# Patient Record
Sex: MALE | Race: BLACK OR AFRICAN AMERICAN | NOT HISPANIC OR LATINO | Employment: FULL TIME | URBAN - METROPOLITAN AREA
[De-identification: names, ages, dates, MRNs, and addresses within clinical notes are randomized per-mention and may not be internally consistent; named-entity substitution may affect disease eponyms.]

---

## 2023-01-26 ENCOUNTER — LAB (OUTPATIENT)
Dept: INTERNAL MEDICINE | Facility: CLINIC | Age: 48
End: 2023-01-26

## 2023-01-26 ENCOUNTER — OFFICE VISIT (OUTPATIENT)
Dept: INTERNAL MEDICINE | Facility: CLINIC | Age: 48
End: 2023-01-26
Payer: COMMERCIAL

## 2023-01-26 VITALS
WEIGHT: 207.7 LBS | DIASTOLIC BLOOD PRESSURE: 86 MMHG | SYSTOLIC BLOOD PRESSURE: 144 MMHG | RESPIRATION RATE: 18 BRPM | OXYGEN SATURATION: 99 % | TEMPERATURE: 98.2 F | HEART RATE: 72 BPM | BODY MASS INDEX: 29.08 KG/M2 | HEIGHT: 71 IN

## 2023-01-26 DIAGNOSIS — E55.9 VITAMIN D DEFICIENCY: ICD-10-CM

## 2023-01-26 DIAGNOSIS — Z12.11 SCREEN FOR COLON CANCER: ICD-10-CM

## 2023-01-26 DIAGNOSIS — R03.0 ELEVATED BLOOD PRESSURE READING WITHOUT DIAGNOSIS OF HYPERTENSION: ICD-10-CM

## 2023-01-26 DIAGNOSIS — R74.01 ELEVATED AST (SGOT): ICD-10-CM

## 2023-01-26 DIAGNOSIS — R76.8 HEPATITIS B CORE ANTIBODY POSITIVE: ICD-10-CM

## 2023-01-26 DIAGNOSIS — Z00.00 ROUTINE GENERAL MEDICAL EXAMINATION AT A HEALTH CARE FACILITY: Primary | ICD-10-CM

## 2023-01-26 DIAGNOSIS — Z11.59 NEED FOR HEPATITIS C SCREENING TEST: ICD-10-CM

## 2023-01-26 DIAGNOSIS — Z11.4 SCREENING FOR HIV (HUMAN IMMUNODEFICIENCY VIRUS): ICD-10-CM

## 2023-01-26 DIAGNOSIS — R73.03 PREDIABETES: ICD-10-CM

## 2023-01-26 LAB — HBA1C MFR BLD: 5.8 % (ref 0–5.6)

## 2023-01-26 PROCEDURE — 87340 HEPATITIS B SURFACE AG IA: CPT | Performed by: NURSE PRACTITIONER

## 2023-01-26 PROCEDURE — 86706 HEP B SURFACE ANTIBODY: CPT | Performed by: NURSE PRACTITIONER

## 2023-01-26 PROCEDURE — 83540 ASSAY OF IRON: CPT | Performed by: NURSE PRACTITIONER

## 2023-01-26 PROCEDURE — 86803 HEPATITIS C AB TEST: CPT | Performed by: NURSE PRACTITIONER

## 2023-01-26 PROCEDURE — 86704 HEP B CORE ANTIBODY TOTAL: CPT | Performed by: NURSE PRACTITIONER

## 2023-01-26 PROCEDURE — 86705 HEP B CORE ANTIBODY IGM: CPT | Performed by: NURSE PRACTITIONER

## 2023-01-26 PROCEDURE — 82248 BILIRUBIN DIRECT: CPT | Performed by: NURSE PRACTITIONER

## 2023-01-26 PROCEDURE — 99213 OFFICE O/P EST LOW 20 MIN: CPT | Mod: 25 | Performed by: NURSE PRACTITIONER

## 2023-01-26 PROCEDURE — 99000 SPECIMEN HANDLING OFFICE-LAB: CPT | Performed by: NURSE PRACTITIONER

## 2023-01-26 PROCEDURE — 99386 PREV VISIT NEW AGE 40-64: CPT | Performed by: NURSE PRACTITIONER

## 2023-01-26 PROCEDURE — 86707 HEPATITIS BE ANTIBODY: CPT | Mod: 90 | Performed by: NURSE PRACTITIONER

## 2023-01-26 PROCEDURE — 87389 HIV-1 AG W/HIV-1&-2 AB AG IA: CPT | Performed by: NURSE PRACTITIONER

## 2023-01-26 PROCEDURE — 80053 COMPREHEN METABOLIC PANEL: CPT | Performed by: NURSE PRACTITIONER

## 2023-01-26 PROCEDURE — 82306 VITAMIN D 25 HYDROXY: CPT | Performed by: NURSE PRACTITIONER

## 2023-01-26 PROCEDURE — 82728 ASSAY OF FERRITIN: CPT | Performed by: NURSE PRACTITIONER

## 2023-01-26 PROCEDURE — 83550 IRON BINDING TEST: CPT | Performed by: NURSE PRACTITIONER

## 2023-01-26 PROCEDURE — 83036 HEMOGLOBIN GLYCOSYLATED A1C: CPT | Performed by: NURSE PRACTITIONER

## 2023-01-26 PROCEDURE — 36415 COLL VENOUS BLD VENIPUNCTURE: CPT | Performed by: NURSE PRACTITIONER

## 2023-01-26 ASSESSMENT — ENCOUNTER SYMPTOMS
DIZZINESS: 0
DYSURIA: 0
JOINT SWELLING: 0
FREQUENCY: 0
PARESTHESIAS: 0
HEMATOCHEZIA: 0
FEVER: 0
EYE PAIN: 1
WEAKNESS: 0
NAUSEA: 0
ABDOMINAL PAIN: 0
CHILLS: 0
HEADACHES: 0
ARTHRALGIAS: 0
CONSTIPATION: 0
SORE THROAT: 0
HEMATURIA: 0
MYALGIAS: 0
DIARRHEA: 0
PALPITATIONS: 0
SHORTNESS OF BREATH: 0
HEARTBURN: 0
NERVOUS/ANXIOUS: 0
COUGH: 0

## 2023-01-26 NOTE — PATIENT INSTRUCTIONS
Let us know your blood pressure readings in about a week. If your readings are above 130/80, I would recommend that you start on treatment for high blood pressure.       Preventive Health Recommendations  Male Ages 40 to 49    Yearly exam:             See your health care provider every year in order to  o   Review health changes.   o   Discuss preventive care.    o   Review your medicines if your doctor has prescribed any.  You should be tested each year for STDs (sexually transmitted diseases) if you re at risk.   Have a cholesterol test every 5 years.   Have a colonoscopy (test for colon cancer) if someone in your family has had colon cancer or polyps before age 50.   After age 45, have a diabetes test (fasting glucose). If you are at risk for diabetes, you should have this test every 3 years.    Talk with your health care provider about whether or not a prostate cancer screening test (PSA) is right for you.    Shots: Get a flu shot each year. Get a tetanus shot every 10 years.     Nutrition:  Eat at least 5 servings of fruits and vegetables daily.   Eat whole-grain bread, whole-wheat pasta and brown rice instead of white grains and rice.   Get adequate Calcium and Vitamin D.     Lifestyle  Exercise for at least 150 minutes a week (30 minutes a day, 5 days a week). This will help you control your weight and prevent disease.   Limit alcohol to one drink per day.   No smoking.   Wear sunscreen to prevent skin cancer.   See your dentist every six months for an exam and cleaning.

## 2023-01-26 NOTE — PROGRESS NOTES
SUBJECTIVE:   CC: Christopher is an 47 year old who presents for preventative health visit. He does not have a PCP, he was previously a patient at the [facility].     He will be traveling to [facility], likely in February. Has an appointment with a travel clinic.     We reviewed his labs from February 2022.  He had elevated AST.  He states that he drinks alcohol sparingly, at most he may have 2 drinks on the weekend socially.    His last A1c was 5.7% consistent with prediabetes.    Patient has been advised of split billing requirements and indicates understanding: Yes     Healthy Habits:     Getting at least 3 servings of Calcium per day:  NO    Bi-annual eye exam:  Yes    Dental care twice a year:  Yes    Sleep apnea or symptoms of sleep apnea:  None    Diet:  Regular (no restrictions)    Frequency of exercise:  2-3 days/week    Duration of exercise:  45-60 minutes    Taking medications regularly:  Yes    Medication side effects:  Not applicable and None    PHQ-2 Total Score: 0    Additional concerns today:  No        Today's PHQ-2 Score:   PHQ-2 ( 1999 Pfizer) 1/26/2023   Q1: Little interest or pleasure in doing things 0   Q2: Feeling down, depressed or hopeless 0   PHQ-2 Score 0   Q1: Little interest or pleasure in doing things Not at all   Q2: Feeling down, depressed or hopeless Not at all   PHQ-2 Score 0       Have you ever done Advance Care Planning? (For example, a Health Directive, POLST, or a discussion with a medical provider or your loved ones about your wishes): No, advance care planning information given to patient to review.  Patient plans to discuss their wishes with loved ones or provider.      Social History     Tobacco Use     Smoking status: Never     Smokeless tobacco: Never   Substance Use Topics     Alcohol use: Yes     Comment: socially         Alcohol Use 1/26/2023   Prescreen: >3 drinks/day or >7 drinks/week? No       Reviewed orders with patient. Reviewed health maintenance and updated  "orders accordingly - Yes      Reviewed and updated as needed this visit by clinical staff   Tobacco  Allergies  Meds  Problems  Med Hx  Surg Hx  Fam Hx          Reviewed and updated as needed this visit by Provider   Tobacco  Allergies  Meds  Problems  Med Hx  Surg Hx  Fam Hx             Review of Systems   Constitutional: Negative for chills and fever.   HENT: Negative for congestion, ear pain, hearing loss and sore throat.    Eyes: Positive for pain. Negative for visual disturbance.   Respiratory: Negative for cough and shortness of breath.    Cardiovascular: Negative for chest pain, palpitations and peripheral edema.   Gastrointestinal: Negative for abdominal pain, constipation, diarrhea, heartburn, hematochezia and nausea.   Genitourinary: Negative for dysuria, frequency, genital sores, hematuria, impotence, penile discharge and urgency.   Musculoskeletal: Negative for arthralgias, joint swelling and myalgias.   Skin: Negative for rash.   Neurological: Negative for dizziness, weakness, headaches and paresthesias.   Psychiatric/Behavioral: Negative for mood changes. The patient is not nervous/anxious.        OBJECTIVE:   BP (!) 144/86   Pulse 72   Temp 98.2  F (36.8  C) (Oral)   Resp 18   Ht 1.791 m (5' 10.51\")   Wt 94.2 kg (207 lb 11.2 oz)   SpO2 99%   BMI 29.37 kg/m      Physical Exam  GENERAL: healthy, alert and no distress  EYES: Eyes grossly normal to inspection, PERRL and conjunctivae and sclerae normal  HENT: ear canals and TM's normal, nose and mouth without ulcers or lesions  NECK: no adenopathy, no asymmetry, masses, or scars and thyroid normal to palpation  RESP: lungs clear to auscultation - no rales, rhonchi or wheezes  CV: regular rate and rhythm, normal S1 S2, no S3 or S4, no murmur, click or rub, no peripheral edema and peripheral pulses strong  ABDOMEN: soft, nontender, no hepatosplenomegaly, no masses and bowel sounds normal  MS: no gross musculoskeletal defects noted, no " edema  NEURO: Normal strength and tone, mentation intact and speech normal  PSYCH: mentation appears normal, affect normal/bright      ASSESSMENT/PLAN:     Problem List Items Addressed This Visit        Digestive    Vitamin D deficiency     Repeat vitamin D level.  He is not currently taking a vitamin D supplement.         Relevant Orders    Vitamin D deficiency screening       Endocrine    Prediabetes     Repeat A1c today         Relevant Orders    Hemoglobin A1c (Completed)       Other    Elevated blood pressure reading without diagnosis of hypertension     Blood pressure is elevated today, he does not have a diagnosis of hypertension.  He is advised to return in 2 weeks for another blood pressure check.  Additionally, his wife is in nursing school and he can have her monitor his blood pressure at home.  He is advised to follow-up with his blood pressure readings are consistently greater than 130/80.         Elevated AST (SGOT)     Upon review of Care Everywhere, and elevated AST is noted.  He does not drink alcohol heavily.  No prior history of liver abnormalities that he is aware of.  We will start with a hepatitis C screening test and repeat his liver profile.  If elevated, consider the addition of hepatitis B screening, ferritin and iron levels         Relevant Orders    Ferritin    Iron and iron binding capacity    Hepatitis B Surface Antibody    Hepatitis B surface antigen    Hepatitis B core antibody   Other Visit Diagnoses     Routine general medical examination at a health care facility    -  Primary    Screen for colon cancer        Reviewed screening options, he would like to do Cologuard    Relevant Orders    COLOGUARD([employer]) (Completed)    Screening for HIV (human immunodeficiency virus)        Relevant Orders    HIV Antigen Antibody Combo    Need for hepatitis C screening test        Relevant Orders    Hepatitis C Screen Reflex to HCV RNA Quant and Genotype        - He is encouraged to  "establish care with a primary care provider.  I am not presently taking new patients to my practice      COUNSELING:   Reviewed preventive health counseling, as reflected in patient instructions       Regular exercise       Healthy diet/nutrition       Colorectal cancer screening      BMI:   Estimated body mass index is 29.37 kg/m  as calculated from the following:    Height as of this encounter: 1.791 m (5' 10.51\").    Weight as of this encounter: 94.2 kg (207 lb 11.2 oz).   Weight management plan: Discussed healthy diet and exercise guidelines      He reports that he has never smoked. He has never used smokeless tobacco.        Pamela Marion NP  [facility]  "

## 2023-01-27 PROBLEM — R73.03 PREDIABETES: Status: ACTIVE | Noted: 2023-01-27

## 2023-01-27 PROBLEM — R74.01 ELEVATED AST (SGOT): Status: ACTIVE | Noted: 2023-01-27

## 2023-01-27 PROBLEM — R03.0 ELEVATED BLOOD PRESSURE READING WITHOUT DIAGNOSIS OF HYPERTENSION: Status: ACTIVE | Noted: 2023-01-27

## 2023-01-27 LAB
ALBUMIN SERPL BCG-MCNC: 4.7 G/DL (ref 3.5–5.2)
ALP SERPL-CCNC: 86 U/L (ref 40–129)
ALT SERPL W P-5'-P-CCNC: 41 U/L (ref 10–50)
ANION GAP SERPL CALCULATED.3IONS-SCNC: 14 MMOL/L (ref 7–15)
AST SERPL W P-5'-P-CCNC: 100 U/L (ref 10–50)
BILIRUB DIRECT SERPL-MCNC: <0.2 MG/DL (ref 0–0.3)
BILIRUB SERPL-MCNC: 0.5 MG/DL
BUN SERPL-MCNC: 12.9 MG/DL (ref 6–20)
CALCIUM SERPL-MCNC: 10 MG/DL (ref 8.6–10)
CHLORIDE SERPL-SCNC: 105 MMOL/L (ref 98–107)
CREAT SERPL-MCNC: 0.92 MG/DL (ref 0.67–1.17)
DEPRECATED CALCIDIOL+CALCIFEROL SERPL-MC: 10 UG/L (ref 20–75)
DEPRECATED HCO3 PLAS-SCNC: 21 MMOL/L (ref 22–29)
FERRITIN SERPL-MCNC: 263 NG/ML (ref 31–409)
GFR SERPL CREATININE-BSD FRML MDRD: >90 ML/MIN/1.73M2
GLUCOSE SERPL-MCNC: 94 MG/DL (ref 70–99)
HBV CORE AB SERPL QL IA: REACTIVE
HBV SURFACE AB SERPL IA-ACNC: >1000 M[IU]/ML
HBV SURFACE AB SERPL IA-ACNC: REACTIVE M[IU]/ML
HBV SURFACE AG SERPL QL IA: NONREACTIVE
HCV AB SERPL QL IA: NONREACTIVE
HIV 1+2 AB+HIV1 P24 AG SERPL QL IA: NONREACTIVE
IRON BINDING CAPACITY (ROCHE): 376 UG/DL (ref 240–430)
IRON SATN MFR SERPL: 35 % (ref 15–46)
IRON SERPL-MCNC: 130 UG/DL (ref 61–157)
POTASSIUM SERPL-SCNC: 4.7 MMOL/L (ref 3.4–5.3)
PROT SERPL-MCNC: 8.4 G/DL (ref 6.4–8.3)
SODIUM SERPL-SCNC: 140 MMOL/L (ref 136–145)

## 2023-01-27 NOTE — ASSESSMENT & PLAN NOTE
Blood pressure is elevated today, he does not have a diagnosis of hypertension.  He is advised to return in 2 weeks for another blood pressure check.  Additionally, his wife is in nursing school and he can have her monitor his blood pressure at home.  He is advised to follow-up with his blood pressure readings are consistently greater than 130/80.

## 2023-01-27 NOTE — ASSESSMENT & PLAN NOTE
Upon review of Care Everywhere, and elevated AST is noted.  He does not drink alcohol heavily.  No prior history of liver abnormalities that he is aware of.  We will start with a hepatitis C screening test and repeat his liver profile.  If elevated, consider the addition of hepatitis B screening, ferritin and iron levels

## 2023-01-30 ENCOUNTER — OFFICE VISIT (OUTPATIENT)
Dept: FAMILY MEDICINE | Facility: CLINIC | Age: 48
End: 2023-01-30
Payer: COMMERCIAL

## 2023-01-30 VITALS
BODY MASS INDEX: 29.68 KG/M2 | HEIGHT: 71 IN | TEMPERATURE: 98.2 F | SYSTOLIC BLOOD PRESSURE: 128 MMHG | OXYGEN SATURATION: 98 % | WEIGHT: 212 LBS | HEART RATE: 80 BPM | DIASTOLIC BLOOD PRESSURE: 80 MMHG

## 2023-01-30 DIAGNOSIS — Z23 NEED FOR MENINGOCOCCAL VACCINATION: ICD-10-CM

## 2023-01-30 DIAGNOSIS — Z71.84 ENCOUNTER FOR COUNSELING FOR TRAVEL: Primary | ICD-10-CM

## 2023-01-30 DIAGNOSIS — Z23 NEED FOR DIPHTHERIA-TETANUS-PERTUSSIS (TDAP) VACCINE: ICD-10-CM

## 2023-01-30 LAB — HBV CORE IGM SERPL QL IA: NONREACTIVE

## 2023-01-30 PROCEDURE — 90472 IMMUNIZATION ADMIN EACH ADD: CPT | Performed by: PHYSICIAN ASSISTANT

## 2023-01-30 PROCEDURE — 90471 IMMUNIZATION ADMIN: CPT | Performed by: PHYSICIAN ASSISTANT

## 2023-01-30 PROCEDURE — 90619 MENACWY-TT VACCINE IM: CPT | Performed by: PHYSICIAN ASSISTANT

## 2023-01-30 PROCEDURE — 99401 PREV MED CNSL INDIV APPRX 15: CPT | Mod: 25 | Performed by: PHYSICIAN ASSISTANT

## 2023-01-30 PROCEDURE — 90715 TDAP VACCINE 7 YRS/> IM: CPT | Performed by: PHYSICIAN ASSISTANT

## 2023-01-30 RX ORDER — AZITHROMYCIN 500 MG/1
TABLET, FILM COATED ORAL
Qty: 6 TABLET | Refills: 0 | Status: SHIPPED | OUTPATIENT
Start: 2023-01-30 | End: 2023-03-01

## 2023-01-30 RX ORDER — MEFLOQUINE HYDROCHLORIDE 250 MG/1
TABLET ORAL
Qty: 14 TABLET | Refills: 0 | Status: SHIPPED | OUTPATIENT
Start: 2023-01-30 | End: 2023-03-01

## 2023-01-30 NOTE — PROGRESS NOTES
SUBJECTIVE: Christopher Bustamante , a 47 year old  male, presents for counseling and information regarding upcoming travel to [facility]. Special medical concerns include: None. He anticipates the following unusual exposures: None.    Itinerary:  [place]    Departure Date: 02/15/2023 Return date: 04/15/2023    Reason for travel (i.e. Business, pleasure): Pleasure    Visiting an urban or rural area?: Both    Accommodations (i.e. hotel, hostel, friends, family, etc): Family, Hotel    Women - First day of your last period: N/A    IMMUNIZATION HISTORY  Have you received any vaccinations in the past 4 weeks?  Yes  Have you ever fainted from having your blood drawn or from an injection?  No  Have you ever had a fever reaction to vaccination?  No  Have you ever had any bad reaction or side effect from any vaccination?  No  Have you ever had hepatitis A or B vaccine?  Yes  Do you live (or work closely) with anyone who has AIDS, an AIDS-like condition, any other immune disorder or who is on chemotherapy for cancer?  No  Have you received any injection of immune globulin or any blood products during the past 12 months?  No    GENERAL MEDICAL HISTORY  Do you have a medical condition that warrants maintenance medication or physician follow-up?  No  Do you have a medical condition that is stable now, but that may recur while traveling?  No  Has your spleen been removed?  No  Have you had an acute illness or a fever in the past 48 hours?  No  Are you pregnant, or might you become pregnant on this trip?  Any chance of pregnancy?  No  Are you breastfeeding?  No  Do you have HIV, AIDS, an AIDS-like condition, any other immune disorder, leukemia or cancer?  No  Do you have a severe combined immunodeficiency disease?  No  Have you had your thymus gland removed or history of problems with your thymus, such as myasthenia gravis, DiGeorge syndrome, or thymoma?  No    Do you have severe thrombocytopenia (low  platelet count) or a coagulation disorder?  No  Have you ever had a convulsion, seizure, epilepsy, neurologic condition or brain infection?  No  Do you have any stomach conditions?  No  Do you have a G6PD deficiency?  No  Do you have severe renal or kidney impairment?  No  Do you have a history of psychiatric problems?  No  Do you have a problem with strange dreams and/or nightmares?  No  Do you have insomnia?  No  Do you have problems with vaginitis?  No  Do you have psoriasis?  No  Are you prone to motion sickness?  No  Have you ever had headaches, nausea, vomiting, or breathing problems from altitude exposure?  No      No past medical history on file.   Immunization History   Administered Date(s) Administered     COVID-19 Vaccine 12+ (Pfizer) 04/24/2021, 05/22/2021, 01/08/2022     COVID-19 Vaccine Bivalent Booster 12+ (Pfizer) 11/08/2022     FLU 6-35 months 10/10/2016     Influenza (IIV3) PF 10/11/2016     Influenza Vaccine >6 months (Alfuria,Fluzone) 11/17/2014, 10/16/2015, 10/08/2017, 09/27/2018, 10/10/2019, 11/11/2021     Influenza Vaccine, 6+MO IM (QUADRIVALENT W/PRESERVATIVES) 10/10/2019, 09/18/2020     Influenza,INJ,MDCK,PF,Quad >6mo(Flucelvax) 09/18/2020, 11/08/2022     MMR 03/01/2014, 07/11/2017     Mantoux Tuberculin Skin Test 03/04/2015     Meningococcal ACWY (Menveo ) 10/27/2017     Tdap (Adacel,Boostrix) 03/01/2014     Twinrix A/B 11/24/2014, 12/18/2014, 06/11/2015     Typhoid IM 06/11/2015, 10/27/2017, 02/04/2021     Typhoid Oral 12/30/2020     Yellow Fever 10/27/2017       No current outpatient medications on file.     No Known Allergies     EXAM: deferred    Immunizations discussed include: Meningococcus and Tetanus/Diphtheria  Malaria prophylaxis recommended: mefloquine  Symptomatic treatment for traveler's diarrhea: bismuth subsalicylate, loperamide/diphenoxylate and azithromycin    ASSESSMENT/PLAN:    (Z71.84) Encounter for counseling for travel  (primary encounter diagnosis)    Comment: Tdap  and meningococcal vaccines today. Patient will return or follow-up with PCP as needed. Prophylaxis given for Traveler's diarrhea and Malaria. All questions were answered.     Plan: typhoid (VIVOTIF) CR capsule, mefloquine         (LARIAM) 250 MG tablet, azithromycin         (ZITHROMAX) 500 MG tablet            (Z23) Need for meningococcal vaccination  Comment:   Plan: MENINGOCOCCAL ACWY 2-55Y (MENQUADFI )            (Z23) Need for diphtheria-tetanus-pertussis (Tdap) vaccine  Comment:   Plan: TDAP VACCINE (Adacel, Boostrix)  [[id number]]              I have reviewed general recommendations for safe travel   including: food/water precautions, insect avoidance, safe sex   practices given high prevalence of HIV and other STDs,   roadway safety. Educational materials and links to the CDC   Traveler's health website have been provided.    Total time 15 minutes, greater than 50 percent in counseling   and coordination of care.

## 2023-01-31 LAB — HBV E AB SERPL QL IA: POSITIVE

## 2023-02-01 DIAGNOSIS — E55.9 VITAMIN D DEFICIENCY: Primary | ICD-10-CM

## 2023-02-01 DIAGNOSIS — R74.01 ELEVATED AST (SGOT): ICD-10-CM

## 2023-02-01 RX ORDER — ERGOCALCIFEROL 1.25 MG/1
50000 CAPSULE, LIQUID FILLED ORAL WEEKLY
Qty: 12 CAPSULE | Refills: 0 | Status: SHIPPED | OUTPATIENT
Start: 2023-02-01 | End: 2023-03-01

## 2023-02-02 ENCOUNTER — TELEPHONE (OUTPATIENT)
Dept: INTERNAL MEDICINE | Facility: CLINIC | Age: 48
End: 2023-02-02
Payer: COMMERCIAL

## 2023-02-02 NOTE — TELEPHONE ENCOUNTER
Called patient via  services but his phone number was not working. Had  leave a generic message on patients spouses phone number. When they return phone call please see message below for results/message.

## 2023-02-02 NOTE — TELEPHONE ENCOUNTER
----- Message from Pamela Marion NP sent at 2/1/2023  7:46 AM CST -----  Call patient: The results of his lab work shows that he appears to have had hepatitis B infection in the past but has cleared the infection.    His lab tests were negative for hepatitis C, HIV.    He once again has a very low vitamin D level of 10.  I am going to start him on a high-dose vitamin D that is taken weekly x12 weeks.  After he finishes this vitamin D boost, he should start an over-the-counter vitamin D supplement of 2000 units daily.    His blood sugar test, A1c, shows that he has prediabetes at 5.8%.  I recommend weight loss, regular physical activity, and healthy diet to help improve and stabilize his blood sugars.    Lastly, he has an elevated AST level.  This has been noted in the past and its a little higher than it was last year.  I am going to order an ultrasound of the liver.  If this is normal, I recommend that he follow-up with his primary care provider (probably needs to establish care with someone) to recheck/monitor his liver profile.

## 2023-02-10 LAB — NONINV COLON CA DNA+OCC BLD SCRN STL QL: NORMAL

## 2023-02-21 ENCOUNTER — HOSPITAL ENCOUNTER (OUTPATIENT)
Dept: ULTRASOUND IMAGING | Facility: HOSPITAL | Age: 48
Discharge: HOME | End: 2023-02-21
Attending: NURSE PRACTITIONER | Admitting: NURSE PRACTITIONER
Payer: COMMERCIAL

## 2023-02-21 DIAGNOSIS — R74.01 ELEVATED AST (SGOT): ICD-10-CM

## 2023-02-21 PROCEDURE — 76705 ECHO EXAM OF ABDOMEN: CPT

## 2023-02-22 ENCOUNTER — APPOINTMENT (OUTPATIENT)
Dept: RADIOLOGY | Facility: HOSPITAL | Age: 48
End: 2023-02-22
Attending: EMERGENCY MEDICINE
Payer: COMMERCIAL

## 2023-02-22 ENCOUNTER — HOSPITAL ENCOUNTER (EMERGENCY)
Facility: HOSPITAL | Age: 48
Discharge: HOME | End: 2023-02-22
Attending: EMERGENCY MEDICINE | Admitting: EMERGENCY MEDICINE
Payer: COMMERCIAL

## 2023-02-22 VITALS
BODY MASS INDEX: 28.87 KG/M2 | TEMPERATURE: 98 F | SYSTOLIC BLOOD PRESSURE: 127 MMHG | HEIGHT: 71 IN | RESPIRATION RATE: 16 BRPM | WEIGHT: 206.2 LBS | HEART RATE: 68 BPM | OXYGEN SATURATION: 100 % | DIASTOLIC BLOOD PRESSURE: 77 MMHG

## 2023-02-22 DIAGNOSIS — R07.89 CHEST WALL PAIN: ICD-10-CM
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
## 2023-02-22 PROCEDURE — 99284 EMERGENCY DEPT VISIT MOD MDM: CPT | Mod: 25

## 2023-02-22 PROCEDURE — 250N000011 HC RX IP 250 OP 636: Performed by: EMERGENCY MEDICINE

## 2023-02-22 PROCEDURE — 96372 THER/PROPH/DIAG INJ SC/IM: CPT | Performed by: EMERGENCY MEDICINE

## 2023-02-22 PROCEDURE — 71046 X-RAY EXAM CHEST 2 VIEWS: CPT

## 2023-02-22 PROCEDURE — 93005 ELECTROCARDIOGRAM TRACING: CPT | Performed by: EMERGENCY MEDICINE

## 2023-02-22 RX ORDER — KETOROLAC TROMETHAMINE 30 MG/ML
30 INJECTION, SOLUTION INTRAMUSCULAR; INTRAVENOUS ONCE
Status: COMPLETED | OUTPATIENT
Start: 2023-02-22 | End: 2023-02-22

## 2023-02-22 RX ADMIN — KETOROLAC TROMETHAMINE 30 MG: 30 INJECTION, SOLUTION INTRAMUSCULAR; INTRAVENOUS at 16:58

## 2023-02-22 ASSESSMENT — ACTIVITIES OF DAILY LIVING (ADL): ADLS_ACUITY_SCORE: 35
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
## 2023-02-22 NOTE — DISCHARGE INSTRUCTIONS
Take ibuprofen 600mg 3 times a day with food for next 5-7 days. Follow up with primary clinic. Return for new/worsening concerns. This may take 7-10 days to resolve.
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
## 2023-02-24 LAB — NONINV COLON CA DNA+OCC BLD SCRN STL QL: NEGATIVE

## 2023-02-27 ENCOUNTER — TELEPHONE (OUTPATIENT)
Dept: INTERNAL MEDICINE | Facility: CLINIC | Age: 48
End: 2023-02-27
Payer: COMMERCIAL

## 2023-02-27 NOTE — TELEPHONE ENCOUNTER
----- Message from NAVI Sotelo CNP sent at 2/27/2023  8:30 AM CST -----  Please call Christopher and let her know her cologuard is negative. He will need to do this again in three years for appropriate colon cancer screening.     My name is Partha Mancilla and I am covering for Pamela Marion today, please reach out with any questions.     Partha Mancilla

## 2023-03-01 ENCOUNTER — OFFICE VISIT (OUTPATIENT)
Dept: FAMILY MEDICINE | Facility: CLINIC | Age: 48
End: 2023-03-01
Payer: COMMERCIAL

## 2023-03-01 DIAGNOSIS — Z76.89 ENCOUNTER TO ESTABLISH CARE: Primary | ICD-10-CM

## 2023-03-01 DIAGNOSIS — Z71.84 ENCOUNTER FOR COUNSELING FOR TRAVEL: ICD-10-CM

## 2023-03-01 DIAGNOSIS — R76.8 HEPATITIS B CORE ANTIBODY POSITIVE: ICD-10-CM

## 2023-03-01 DIAGNOSIS — Z23 NEED FOR IMMUNIZATION AGAINST TYPHOID: ICD-10-CM

## 2023-03-01 DIAGNOSIS — R74.8 ELEVATED LIVER ENZYMES: ICD-10-CM

## 2023-03-01 DIAGNOSIS — E55.9 VITAMIN D DEFICIENCY: ICD-10-CM

## 2023-03-01 LAB
ALBUMIN SERPL BCG-MCNC: 4.1 G/DL (ref 3.5–5.2)
ALP SERPL-CCNC: 90 U/L (ref 40–129)
ALT SERPL W P-5'-P-CCNC: 110 U/L (ref 10–50)
ANION GAP SERPL CALCULATED.3IONS-SCNC: 10 MMOL/L (ref 7–15)
AST SERPL W P-5'-P-CCNC: 120 U/L (ref 10–50)
BILIRUB SERPL-MCNC: 0.3 MG/DL
BUN SERPL-MCNC: 11.5 MG/DL (ref 6–20)
CALCIUM SERPL-MCNC: 9.5 MG/DL (ref 8.6–10)
CHLORIDE SERPL-SCNC: 104 MMOL/L (ref 98–107)
CREAT SERPL-MCNC: 0.93 MG/DL (ref 0.67–1.17)
DEPRECATED HCO3 PLAS-SCNC: 24 MMOL/L (ref 22–29)
GFR SERPL CREATININE-BSD FRML MDRD: >90 ML/MIN/1.73M2
GLUCOSE SERPL-MCNC: 102 MG/DL (ref 70–99)
POTASSIUM SERPL-SCNC: 4.1 MMOL/L (ref 3.4–5.3)
PROT SERPL-MCNC: 7.5 G/DL (ref 6.4–8.3)
SODIUM SERPL-SCNC: 138 MMOL/L (ref 136–145)

## 2023-03-01 PROCEDURE — 87517 HEPATITIS B DNA QUANT: CPT

## 2023-03-01 PROCEDURE — 99213 OFFICE O/P EST LOW 20 MIN: CPT

## 2023-03-01 PROCEDURE — 80053 COMPREHEN METABOLIC PANEL: CPT

## 2023-03-01 PROCEDURE — 36415 COLL VENOUS BLD VENIPUNCTURE: CPT

## 2023-03-01 RX ORDER — BNT162B2 ORIGINAL AND OMICRON BA.4/BA.5 .1125; .1125 MG/2.25ML; MG/2.25ML
INJECTION, SUSPENSION INTRAMUSCULAR
COMMUNITY
Start: 2022-11-08

## 2023-03-01 RX ORDER — AZITHROMYCIN 500 MG/1
TABLET, FILM COATED ORAL
Qty: 6 TABLET | Refills: 0
Start: 2023-03-01 | End: 2023-03-01

## 2023-03-01 RX ORDER — ERGOCALCIFEROL 1.25 MG/1
50000 CAPSULE, LIQUID FILLED ORAL WEEKLY
Qty: 12 CAPSULE | Refills: 0 | Status: SHIPPED | OUTPATIENT
Start: 2023-03-01

## 2023-03-01 RX ORDER — MEFLOQUINE HYDROCHLORIDE 250 MG/1
TABLET ORAL
Qty: 15 TABLET | Refills: 0 | Status: SHIPPED | OUTPATIENT
Start: 2023-03-01 | End: 2023-06-07

## 2023-03-01 RX ORDER — INFLUENZA A VIRUS A/NEBRASKA/14/2019 (H1N1) ANTIGEN (MDCK CELL DERIVED, PROPIOLACTONE INACTIVATED), INFLUENZA A VIRUS A/DELAWARE/39/2019 (H3N2) ANTIGEN (MDCK CELL DERIVED, PROPIOLACTONE INACTIVATED), INFLUENZA B VIRUS B/SINGAPORE/INFTT-16-0610/2016 ANTIGEN (MDCK CELL DERIVED, PROPIOLACTONE INACTIVATED), INFLUENZA B VIRUS B/DARWIN/7/2019 ANTIGEN (MDCK CELL DERIVED, PROPIOLACTONE INACTIVATED) 15; 15; 15; 15 UG/.5ML; UG/.5ML; UG/.5ML; UG/.5ML
INJECTION, SUSPENSION INTRAMUSCULAR
COMMUNITY
Start: 2022-11-08

## 2023-03-01 RX ORDER — AZITHROMYCIN 500 MG/1
TABLET, FILM COATED ORAL
Qty: 6 TABLET | Refills: 0 | Status: SHIPPED | OUTPATIENT
Start: 2023-03-01

## 2023-03-01 ASSESSMENT — PAIN SCALES - GENERAL: PAINLEVEL: NO PAIN (0)

## 2023-03-01 ASSESSMENT — ENCOUNTER SYMPTOMS
FEVER: 0
CHILLS: 0
DIAPHORESIS: 0
CHEST TIGHTNESS: 0
SHORTNESS OF BREATH: 0
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
## 2023-03-01 NOTE — PATIENT INSTRUCTIONS
We will want to recheck your vitamin D and liver function around June 1, 2023. You can call [phone] to schedule this for that time. Take one vitamin D pill once a week for 12 weeks.

## 2023-03-02 LAB — HBV DNA SERPL NAA+PROBE-ACNC: NOT DETECTED IU/ML

## 2023-03-09 ENCOUNTER — TELEPHONE (OUTPATIENT)
Dept: FAMILY MEDICINE | Facility: CLINIC | Age: 48
End: 2023-03-09
Payer: COMMERCIAL

## 2023-03-09 DIAGNOSIS — R74.8 ELEVATED LIVER ENZYMES: Primary | ICD-10-CM

## 2023-03-09 NOTE — TELEPHONE ENCOUNTER
----- Message from NAVI Sotelo CNP sent at 3/9/2023  8:15 AM CST -----  Please call patient and set-up an appointment with lab on 3/29/2023 for a recheck of his liver function and for an in office BP check with our staff. I had told him he needed to see me that day too, but I can just follow-up with a phone call after I see the BP and the lab.     I already discussed results with him. He is going to make diet changes for NAFLD and we will refer to GI if LFTs are elevated on recheck.     Thanks,     Partha Mancilla DNP, APRN, FNP-C

## 2023-03-09 NOTE — TELEPHONE ENCOUNTER
Spoke with patient. Patient scheduled for labs and BP check for 3/31- patient will be traveling that week.

## 2023-03-14 VITALS
HEIGHT: 71 IN | TEMPERATURE: 98.4 F | WEIGHT: 208 LBS | SYSTOLIC BLOOD PRESSURE: 132 MMHG | RESPIRATION RATE: 18 BRPM | DIASTOLIC BLOOD PRESSURE: 82 MMHG | HEART RATE: 81 BPM | OXYGEN SATURATION: 100 % | BODY MASS INDEX: 29.12 KG/M2

## 2023-03-14 ASSESSMENT — ENCOUNTER SYMPTOMS
CONSTIPATION: 0
DIZZINESS: 0
DIFFICULTY URINATING: 0
NERVOUS/ANXIOUS: 0
PARESTHESIAS: 0
MYALGIAS: 0
PALPITATIONS: 0
NUMBNESS: 0
HEADACHES: 0
DIARRHEA: 0
ABDOMINAL PAIN: 0
ARTHRALGIAS: 0
WEAKNESS: 0

## 2023-03-31 ENCOUNTER — LAB (OUTPATIENT)
Dept: LAB | Facility: CLINIC | Age: 48
End: 2023-03-31
Payer: COMMERCIAL

## 2023-03-31 ENCOUNTER — ALLIED HEALTH/NURSE VISIT (OUTPATIENT)
Dept: FAMILY MEDICINE | Facility: CLINIC | Age: 48
End: 2023-03-31
Payer: COMMERCIAL

## 2023-03-31 VITALS — WEIGHT: 204.3 LBS | SYSTOLIC BLOOD PRESSURE: 122 MMHG | BODY MASS INDEX: 28.7 KG/M2 | DIASTOLIC BLOOD PRESSURE: 80 MMHG

## 2023-03-31 DIAGNOSIS — R03.0 ELEVATED BLOOD PRESSURE READING WITHOUT DIAGNOSIS OF HYPERTENSION: Primary | ICD-10-CM

## 2023-03-31 DIAGNOSIS — R74.8 ELEVATED LIVER ENZYMES: ICD-10-CM

## 2023-03-31 LAB
ALBUMIN SERPL BCG-MCNC: 4.2 G/DL (ref 3.5–5.2)
ALP SERPL-CCNC: 83 U/L (ref 40–129)
ALT SERPL W P-5'-P-CCNC: 35 U/L (ref 10–50)
ANION GAP SERPL CALCULATED.3IONS-SCNC: 11 MMOL/L (ref 7–15)
AST SERPL W P-5'-P-CCNC: 75 U/L (ref 10–50)
BILIRUB SERPL-MCNC: 0.3 MG/DL
BUN SERPL-MCNC: 9.8 MG/DL (ref 6–20)
CALCIUM SERPL-MCNC: 9.6 MG/DL (ref 8.6–10)
CHLORIDE SERPL-SCNC: 104 MMOL/L (ref 98–107)
CREAT SERPL-MCNC: 0.84 MG/DL (ref 0.67–1.17)
DEPRECATED HCO3 PLAS-SCNC: 23 MMOL/L (ref 22–29)
GFR SERPL CREATININE-BSD FRML MDRD: >90 ML/MIN/1.73M2
GLUCOSE SERPL-MCNC: 86 MG/DL (ref 70–99)
POTASSIUM SERPL-SCNC: 4.3 MMOL/L (ref 3.4–5.3)
PROT SERPL-MCNC: 7.9 G/DL (ref 6.4–8.3)
SODIUM SERPL-SCNC: 138 MMOL/L (ref 136–145)

## 2023-03-31 PROCEDURE — 99207 PR NO CHARGE NURSE ONLY: CPT

## 2023-03-31 PROCEDURE — 36415 COLL VENOUS BLD VENIPUNCTURE: CPT

## 2023-03-31 PROCEDURE — 80053 COMPREHEN METABOLIC PANEL: CPT

## 2023-03-31 NOTE — PROGRESS NOTES
I met with Christopher Bustamante at the request of Arina Mancilla CNP to recheck his blood pressure.  Blood pressure medications on the med list were reviewed with patient.    Patient has taken all medications as per usual regimen: NA  Patient reports tolerating them without any issues or concerns: NA    Vitals:    03/31/23 1608   BP: 122/80   BP Location: Right arm   Patient Position: Sitting   Cuff Size: Adult Regular       Blood pressure was taken, previous encounter was reviewed, recorded blood pressure below 140/90.  Patient was discharged and the note will be sent to the provider for final review.       Glenn Gross Jr., CMA on 3/31/2023 at 4:17 PM

## 2023-05-19 ENCOUNTER — ALLIED HEALTH/NURSE VISIT (OUTPATIENT)
Dept: FAMILY MEDICINE | Facility: CLINIC | Age: 48
End: 2023-05-19
Payer: COMMERCIAL

## 2023-05-19 VITALS
SYSTOLIC BLOOD PRESSURE: 118 MMHG | BODY MASS INDEX: 27.28 KG/M2 | DIASTOLIC BLOOD PRESSURE: 80 MMHG | WEIGHT: 194.2 LBS | HEART RATE: 68 BPM

## 2023-05-19 DIAGNOSIS — R03.0 ELEVATED BLOOD PRESSURE READING WITHOUT DIAGNOSIS OF HYPERTENSION: Primary | ICD-10-CM

## 2023-05-19 PROCEDURE — 99207 PR NO CHARGE NURSE ONLY: CPT

## 2023-05-19 NOTE — PROGRESS NOTES
Patient came in for his BP and weight check. His BP was 118/80 and pulse was 68. His weight was 194 lbs and 3.2 oz

## 2023-06-16 ENCOUNTER — MEDICAL CORRESPONDENCE (OUTPATIENT)
Dept: HEALTH INFORMATION MANAGEMENT | Facility: CLINIC | Age: 48
End: 2023-06-16
Payer: COMMERCIAL

## 2023-06-29 ENCOUNTER — TELEPHONE (OUTPATIENT)
Dept: INTERNAL MEDICINE | Facility: CLINIC | Age: 48
End: 2023-06-29
Payer: COMMERCIAL

## 2023-07-11 DIAGNOSIS — Z71.84 ENCOUNTER FOR COUNSELING FOR TRAVEL: ICD-10-CM

## 2023-07-12 RX ORDER — MEFLOQUINE HYDROCHLORIDE 250 MG/1
TABLET ORAL
Qty: 4 TABLET | Refills: 0 | Status: CANCELLED | OUTPATIENT
Start: 2023-07-12

## 2023-07-12 NOTE — TELEPHONE ENCOUNTER
Please call pharmacy and clarify.  We had contacted patient when they sent the last refill request and he said he needed it through August 19, which would have been 4 more tablets.  I did send for 4 more tablets 1 month ago.  Is patient needing more medication to get him to the 4 weeks after returning from the trip?     Partha
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
## 2023-07-17 NOTE — TELEPHONE ENCOUNTER
Left message for patient to call back. When  he calls back please relay message to see if he needs more medication

## 2023-07-19 NOTE — TELEPHONE ENCOUNTER
Called patient and LMTCB #2, rx request to be deleted if no call back from patient today.    Edyta RODARTE  [facility] Clinical Staff

## 2024-01-18 ENCOUNTER — PATIENT OUTREACH (OUTPATIENT)
Dept: CARE COORDINATION | Facility: CLINIC | Age: 49
End: 2024-01-18

## 2024-02-01 ENCOUNTER — PATIENT OUTREACH (OUTPATIENT)
Dept: CARE COORDINATION | Facility: CLINIC | Age: 49
End: 2024-02-01
Payer: COMMERCIAL

## 2024-02-12 ENCOUNTER — OFFICE VISIT (OUTPATIENT)
Dept: INTERNAL MEDICINE | Facility: CLINIC | Age: 49
End: 2024-02-12
Payer: COMMERCIAL

## 2024-02-12 VITALS
BODY MASS INDEX: 28.69 KG/M2 | TEMPERATURE: 98.3 F | RESPIRATION RATE: 16 BRPM | HEIGHT: 71 IN | SYSTOLIC BLOOD PRESSURE: 128 MMHG | HEART RATE: 74 BPM | WEIGHT: 204.9 LBS | OXYGEN SATURATION: 98 % | DIASTOLIC BLOOD PRESSURE: 88 MMHG

## 2024-02-12 DIAGNOSIS — K76.0 HEPATIC STEATOSIS: ICD-10-CM

## 2024-02-12 DIAGNOSIS — Z00.00 ROUTINE GENERAL MEDICAL EXAMINATION AT A HEALTH CARE FACILITY: Primary | ICD-10-CM

## 2024-02-12 DIAGNOSIS — Z12.5 PROSTATE CANCER SCREENING: ICD-10-CM

## 2024-02-12 DIAGNOSIS — R74.01 ELEVATED AST (SGOT): ICD-10-CM

## 2024-02-12 DIAGNOSIS — R73.03 PREDIABETES: ICD-10-CM

## 2024-02-12 DIAGNOSIS — R03.0 ELEVATED BLOOD PRESSURE READING WITHOUT DIAGNOSIS OF HYPERTENSION: ICD-10-CM

## 2024-02-12 DIAGNOSIS — E78.2 MIXED HYPERLIPIDEMIA: ICD-10-CM

## 2024-02-12 DIAGNOSIS — E55.9 VITAMIN D DEFICIENCY: ICD-10-CM

## 2024-02-12 LAB — HBA1C MFR BLD: 5.9 % (ref 0–5.6)

## 2024-02-12 PROCEDURE — 80061 LIPID PANEL: CPT | Performed by: NURSE PRACTITIONER

## 2024-02-12 PROCEDURE — 36415 COLL VENOUS BLD VENIPUNCTURE: CPT | Performed by: NURSE PRACTITIONER

## 2024-02-12 PROCEDURE — 82306 VITAMIN D 25 HYDROXY: CPT | Performed by: NURSE PRACTITIONER

## 2024-02-12 PROCEDURE — 83036 HEMOGLOBIN GLYCOSYLATED A1C: CPT | Performed by: NURSE PRACTITIONER

## 2024-02-12 PROCEDURE — 99396 PREV VISIT EST AGE 40-64: CPT | Performed by: NURSE PRACTITIONER

## 2024-02-12 PROCEDURE — G0103 PSA SCREENING: HCPCS | Performed by: NURSE PRACTITIONER

## 2024-02-12 PROCEDURE — 80076 HEPATIC FUNCTION PANEL: CPT | Performed by: NURSE PRACTITIONER

## 2024-02-12 SDOH — HEALTH STABILITY: PHYSICAL HEALTH: ON AVERAGE, HOW MANY DAYS PER WEEK DO YOU ENGAGE IN MODERATE TO STRENUOUS EXERCISE (LIKE A BRISK WALK)?: 2 DAYS

## 2024-02-12 SDOH — HEALTH STABILITY: PHYSICAL HEALTH: ON AVERAGE, HOW MANY MINUTES DO YOU ENGAGE IN EXERCISE AT THIS LEVEL?: 30 MIN

## 2024-02-12 ASSESSMENT — SOCIAL DETERMINANTS OF HEALTH (SDOH): HOW OFTEN DO YOU GET TOGETHER WITH FRIENDS OR RELATIVES?: TWICE A WEEK

## 2024-02-12 NOTE — COMMUNITY RESOURCES LIST (ENGLISH)
02/12/2024   [facility]  N/A  For questions about this resource list or additional care needs, please contact your primary care clinic or care manager.  Phone: [phone]   Email: N/A   Address: [address] [place] [address]   Hours: N/A        Financial Stability       Rent and mortgage payment assistance  1  [facility] - Rent Payment Assistance Distance: 1.47 miles      Phone/Virtual   [address] [place] [address]  Language: English  Hours: Mon - Fri 9:00 AM - 4:00 PM , Sun 9:30 AM - 12:00 PM  Fees: Free   Phone: (914) 363-5879 Email: ligia@[facility] CampusAmideBio.Rival IQ Website: http://[facility] CampusPebbles Interfaces.org/[facility]/Opzi/     2  [facility] Distance: 2.77 miles      In-Person, Phone/Virtual   [address] [place] [address]  Language: English, Moldovan  Hours: Mon - Thu 9:00 AM - 4:00 PM  Fees: Free   Phone: (855) 907-9636 Email: center@saintandrews.[facility] Website: https://www.saintandrews.[facility]/     Utility payment assistance  3  [facility] - Heatshare Distance: 1.47 miles      Phone/Virtual   [address] [place] [address]  Language: English  Hours: Mon - Fri 9:00 AM - 4:00 PM , Sun 9:30 AM - 12:00 PM  Fees: Free   Phone: (466) 585-8559 Email: ligia@[facility] CampusAmideBio.org Website: http://[facility] CampusPebbles Interfaces.org/[facility]/Opzi/     4  [place] Public Utilities Commission - [place]'s Telephone Assistance Plan (TAP) and Federal Lifeline and Affordable Connectivity Program (ACP) Distance: 5.84 miles      Phone/Virtual   [address] [place] [address]  Language: English  Fees: Free   Phone: (323) 319-7664 Email: deborah.elbert@[facility].mn. Website: https://[place].gov/puc/consumers/telephone/          Food and Nutrition       Food pantry  5  [facility] Service  Center - Food Shelf Distance: 1.47 miles      Pickup   [address] [place] [address]  Language: English  Hours: Mon - Wed 9:30 AM - 2:30 PM  Fees: Free   Phone: (583) 496-9099 Email: ligia@[facility] Fort WorthKnowRe.WyzAnt.com Website: http://[facility].org/[facility] HospitalsusanEstacada/     6  [facility] Food Shelf - Food Shelf Distance: 1.76 miles      Pickup   [address] [place] [address]  Language: English, Jordanian  Hours: Mon - Tue 2:00 PM - 7:00 PM , Wed 11:00 AM - 2:00 PM , Fri 11:00 AM - 2:00 PM  Fees: Free   Phone: (161) 845-2477 Email: info@official.fm.WyzAnt.com Website: http://official.fm.org     SNAP application assistance  7  Comunidades Latinas Unidas En Servicio (CLUES) - [place] Distance: 7.04 miles      In-Person   [address] [place] [address]  Language: English, Jordanian  Hours: Mon - Fri 8:30 AM - 5:00 PM  Fees: Free   Phone: (945) 654-2114 Email: info@clues.org Website: http://www.clues.org     8  [place] Department of Human Services - MNFoodHelCarol[facility] (SNAP) Distance: 8.04 miles      Phone/Virtual   [address] [address] [place] [address]  Language: English, Hmong, Ecuadorean, Wallisian, Jordanian, Tamazight  Hours: Mon - Fri 9:00 AM - 5:00 PM  Fees: Free   Phone: (283) 581-4066 Website: https://[place].gov/dhs/people-we-serve/adults/economic-assistance/food-nutrition/programs-and-services/supplemental-nutrition-assistance-program.jsp     Soup kitchen or free meals  9  [facility] - Thursday Night Community Meal Distance: 2.77 miles      In-Person   [address] [place] [address]  Language: English, Jordanian  Hours: Thu 6:00 PM - 7:00 PM  Fees: Free   Phone: (960) 610-9941 Email: center@[facility]s.[facility] Website: https://www.saintandrews.WyzAnt.com/community-resource-center/     10  [facility] - Loaves and Fishes - Loaves and Fishes Distance: 4.63 miles      Pickup   [address] [place] [address]   Language: English  Hours: Wed 5:30 PM - 6:30 PM  Fees: Free   Phone: (743) 522-1166 Email: office@REVENUE.com.org Website: https://www.My Healthy WorldK[facility]tering Health Hamilton.org          Transportation       Free or low-cost transportation  11  [facility] CenterT[facility] Bus Loop - Free or low-cost transportation Distance: 1.45 miles      In-Person   [address] [place] [address]  Language: English  Hours: Mon - Fri 9:00 AM - 5:00 PM  Fees: Free   Phone: (176) 161-5396 Email: info@[employer] Camp Website: https://www.My Online Camp/     12  [facility] - Free Bus and Gas Cards - Free or low-cost transportation Distance: 1.47 miles      [facility]   [address] [place] [address]  Language: English  Hours: Mon - Fri 9:00 AM - 4:00 PM , Sun 9:30 AM - 12:00 PM  Fees: Free   Phone: (313) 317-9115 Email: ligia@[facility] Website: http://[facility]/[place]/     Transportation to medical appointments  13  Discover Ride Distance: 5.75 miles      In-Person   [address] [address]  Language: English  Hours: Mon - Thu 6:00 AM - 6:00 PM , Fri 6:00 AM - 5:00 PM  Fees: Insurance, Self Pay   Phone: (839) 658-3609 Email: office@Cloudbuild Website: https://www.Cloudbuild/     14  [employer] Transportation - Non-Emergency Medical Transportation Distance: 9.21 miles      In-Person   [address] [place] [address]  Language: English  Hours: Mon - Fri 8:00 AM - 4:00 PM Appt. Only  Fees: Self Pay   Phone: (775) 994-1757 Website: http://www.[employer].org/Medical-Services/Emergency-medical-services/Non-emergency-transportation/          Important Numbers & Websites       Emergency Services   911  [facility] Services   311  Poison Control   (189) 448-3086  Suicide Prevention Lifeline   (552) 261-1978 (TALK)  Child Abuse Hotline   (322) 760-5765 (4-A-Child)  Sexual Assault Hotline   (608) 503-2751 (HOPE)  [place]ent Runaway Safeline   (415)  327-8902 (RUNAWAY)  All-Options Talkline   (408) 487-8744  Substance Abuse Referral   (993) 291-5381 (HELP)

## 2024-02-12 NOTE — COMMUNITY RESOURCES LIST (PATIENT PREFERRED LANGUAGE)
02/12/2024   [facility]  N/A  Pour toute question concernant cette liste de ressources ou arsenio besoins en soins supplémentaires, jhonny contacter votre clinique de soins primaires ou votre gestionnaire de soins.  Phone: [phone]   Email: N/A   Address: [address] [place], [place] [address]   Hours: N/A        Stabilité financière       Aide au paiement du loyer et du prêt hypothécaire  1  Armée du [place] de culte et de service de [place] - Aide au paiement du loyer Distance: 1.47 kilomètres      Téléphone/Virtuel   [address] [place] [address]  Langue: Anglais  Heures: Lundi - Vendredi 09:00 - 16:00 , alize 09:30 - 12:00  Frais: Arnol   Phone: ([phone]) [phone] Email: ligia@[facility] Website: http://[facility].org/[facility]/[place]/     2  [facility] de ressources communautaires Distance: 2.77 kilomètres      En personne, Téléphone/Virtuel   [address] [place] [address]  Langue: Alexa Hubbard  Heures: Lundi - Jeudi 09:00 - 16:00  Frais: Arnol   Phone: (828) [phone] Email: center@[facility]s.[facility] Website: https://www.saintandrews.org/[facility]-resource-center/     Aide au paiement brayan services publics  3  [employer] [place] de culte et de service de [place] - Partage de chaleur Distance: 1.47 kilomètres      Téléphone/Virtuel   [address] [place] [address]  Langue: Mariannais  Heures: Lundi - Vendredi 09:00 - 16:00 , alize 09:30 - 12:00  Frais: Arnol   Phone: (954) 335-1950 Email: ligia@[facility].salvationarmy.org Website: http://salvationarmynorth.org/community/[place]/     4  Commission brayan services publics du [place] - Plan d'assistance téléphonique (TAP) du [place] et programme fédéral de bouée de sauvetage et de connectivité abordable (ACP) Distance: 5.84 kilomètres      Téléphone/Virtuel   [address] [place] [address]  Langue: Stevie Fox:  Nidiatuit   Phone: (847) 299-5201 Email: consumer.elbert@[facility]. Website: https://[place].gov/puc/consumers/telephone/          Alimentation et nutrition       Patricia  5  Armée du [facility] de culte et de service de [place] - Étagère alimentaire Distance: 1.47 kilomètres      Ramasser   [address] [place] [address]  Langue: Anglais  Heures: Lundi - Mercredi 09:30 - 14:30  Frais: Gratuit   Phone: (612) 533-4777 Email: ligia@[facility].[employer].org Website: http://[facility] EnergeticsP[facility].org/[facility]/Hemet/     6  Étagère alimentaire de la région de ?[facility] - Étagère alimentaire Distance: 1.76 kilomètres      Ramasser   [address] [place] [address]  Langue: Anglais, Espagnol  Heures: Lundi - Bo 14:00 - 19:00 , Mercredi 11:00 - 14:00 , Vendredi 11:00 - 14:00  Frais: Gratuit   Phone: (440) [phone] Email: info@[employer]org Website: http://[employer]..org     Aide à l'application SNAP  7  Comunidades Latinas Unidas En Servicio (CLUES) - [place] Distance: 7.04 kilomètres      En personne   [address] [place] [address]  Langue: Anglais, Espagnol  Heures: Lundi - Vendredi 08:30 - 17:00  Frais: Gratuit   Phone: (486) 957-8437 Email: info@clues.org Website: http://www.clues.org     8  Département brayan services sociaux [place] (SNAP) Distance: 8.04 kilomètres      Téléphone/Virtue   [address] [address] [place] [address]  Langue: Anglais, Espagnol, Hmong, russe, Niuean, vietnamien  Heures: Lundi - Vendredi 09:00 - 17:00  Frais: Gratuit   Phone: (504) 931-6737 Website: https://[place].gov/dhs/people-we-serve/adults/economic-assistance/food-nutrition/programs-and-services/supplemental-nutrition-assistance-program.jsp     Soupe populaire ou repas gratuits  9  [facility] de ressources communautaires - Repas communautaire du [facility] soir Distance: 2.77 kilomètres      En personne   [address] [place] [address]   Langue: Alexa Hubbard  Heures: Jeudi 18:00 - 19:00  Frais: Gratuit   Phone: ([phone]) [phone] Email: center@saintandrewsYouRenewNortheast [facility] Website: https://www.saintandrews.[employer]el[employer]ost/[facility]-resource-center/     10  [place] luthérienne [place] - Pains et poissons - Pains et poissons Distance: 4.63 kilomètres      Ramasser   [address] [place] [address]  Langue: Anglais  Heures: Mercredi 17:30 - 18:30  Frais: Gratuit   Phone: (119) 243-3859 Email: office@[employer] Website: https://www.SoMoLendmn.org          Transport       Transport gratuit ou à faible coût  11  [employer] - Boucle de bus communautaire - Transport gratuit ou à faible coût Distance: 1.45 kilomètres      En personne   [address] [place] [address]  Langue: Anglais  Heures: Lundi - Vendredi 09:00 - 17:00  Frais: Gratuit   Phone: (969) 566-9045 Email: info@[employer]. Website: https://www.Bridge U.S./     12  Armée du [facility] de culte et de service de [place] - [place] de bus et d'essence gratuites - Transport gratuit ou à faible coût Distance: 1.47 kilomètres      [facility]   [address] [place] [address]  Langue: Anglais  Heures: Lundi - Vendredi 09:00 - 16:00 , alize 09:30 - 12:00  Frais: Gratuit   Phone: (623) 635-7023 Email: ligia@[facility]\""[place].org Website: http://[facility] HospitalAuthentic8[facility].org/[facility]/[place]/     Transport aux rendez-vous médicaux  13  Découvrez Ride Distance: 5.75 kilomètres      En personne   [address] [address]  Langue: Anglais  Heures: Lundi - Jeudi 06:00 - 18:00 , Vendredi 06:00 - 17:00  Danetteis: Assurance, Auto-pairuddy   Phone: (321) [phone] Email: office@[employer] Website: https://www.[employer]/     14  [facility] Transport Médical - Transport médical non urgent Distance: 9.21 kilomètres      En [address] [address]  Langue: Stevie Waters: Neeta - Yousifredpatrick 08:00 - 16:00 Jessica Fox: Auto-paiement    Phone: (209) 636-3843 Website: http://www.[facility].org/Medical-Services/Emergency-medical-services/Non-emergency-transportation/          Numéros et sites Web importants       Services d'urgence   911  Services municipaux   311  Contrôle du poison   (433) 341-2062  Ligne de vie pour la prévention du suicide   (559) 156-4517 (TALK)  Hotline de maltraitance brayan enfants   (352) 834-8672 (4-A-Child)  Hotline pour les agressions sexuelles   (147) 205-7130 (HOPE)  National Runaway Safeline   (946) 395-6637 (RUNAWAY)  Talkline toutes options   (964) 299-4088  Renvoi à la toxicomanie   (751) [phone] (HELP)

## 2024-02-12 NOTE — PROGRESS NOTES
"Preventive Care Visit  [facility]  Paemla Marion NP  Feb 12, 2024    Assessment & Plan   Problem List Items Addressed This Visit       Vitamin D deficiency     He just completed a high-dose vitamin D regimen.  He is advised to start vitamin D 2000 international units daily.         Relevant Orders    Vitamin D Deficiency (Completed)    Elevated blood pressure reading without diagnosis of hypertension     Diastolic reading is elevated.  His wife is an RN and is able to monitor his blood pressure. If readings are >140/90, he should be re-evaluated          Prediabetes     A1c is 5.9%.  Resume regular physical activity, less alcohol, and a Mediterranean diet         Relevant Orders    Hemoglobin A1c (Completed)    Hepatic steatosis     Weight loss encouraged.         Mixed hyperlipidemia     10-year ASCVD risk is 4.5%.  No medication is indicated at this time but he is encouraged to resume regular physical activity and a low-cholesterol diet.          Other Visit Diagnoses       Routine general medical examination at a health care facility    -  Primary    Relevant Orders    Lipid panel reflex to direct LDL Fasting (Completed)    Prostate cancer screening        Relevant Orders    PSA, screen (Completed)           I do not have record of his most recent COVID-19 vaccine but he states that this was given last fall.  He will bring documentation to a future visit.         BMI  Estimated body mass index is 28.78 kg/m  as calculated from the following:    Height as of this encounter: 1.797 m (5' 10.75\").    Weight as of this encounter: 92.9 kg (204 lb 14.4 oz).       Counseling  Appropriate preventive services were discussed with this patient, including applicable screening as appropriate for fall prevention, nutrition, physical activity, Tobacco-use cessation, weight loss and cognition.  Checklist reviewing preventive services available has been given to the patient.  Reviewed patient's diet, " addressing concerns and/or questions.   He is at risk for lack of exercise and has been provided with information to increase physical activity for the benefit of his well-being.   He is at risk for psychosocial distress and has been provided with information to reduce risk.       Subjective   Christopher is a 48 year old, presenting for the following:  Physical        2/12/2024     4:53 PM   Additional Questions   Roomed by Ronal DE LA TORRE        Health Care Directive  Patient does not have a Health Care Directive or Living Will: Discussed advance care planning with patient; information given to patient to review.    HPI  Christopher Bustamante is a 47 y/o here for a physical.    He had prediabetes. He had been exercising regularly and eating well but then he went to [facility] Nov to January and drank more alcohol and was not exercising regularly. When he is here, he exercises at the [facility].     Blood pressure- He had a check at the dentist last week, his reading was 121/88.         2/12/2024   General Health   How would you rate your overall physical health? Good   Feel stress (tense, anxious, or unable to sleep) Only a little   (!) STRESS CONCERN      2/12/2024   Nutrition   Three or more servings of calcium each day? (!) NO   Diet: Low salt    Low fat/cholesterol    Breakfast skipped   How many servings of fruit and vegetables per day? (!) 2-3   How many sweetened beverages each day? 0-1         2/12/2024   Exercise   Days per week of moderate/strenous exercise 2 days   Average minutes spent exercising at this level 30 min   (!) EXERCISE CONCERN      2/12/2024   Social Factors   Frequency of gathering with friends or relatives Twice a week         2/12/2024   Dental   Dentist two times every year? Yes          No data to display                    2/12/2024   Substance Use   Alcohol more than 3/day or more than 7/wk No   Do you use any other substances recreationally? (!) ALCOHOL     Social History     Tobacco Use    Smoking status: Never     " Passive exposure: Never    Smokeless tobacco: Never   Vaping Use    Vaping Use: Never used   Substance Use Topics    Alcohol use: Yes     Comment: socially           2/12/2024   STI Screening   New sexual partner(s) since last STI/HIV test? No   The 10-year ASCVD risk score (Jennifer MORA, et al., 2019) is: 4.9%    Values used to calculate the score:      Age: 48 years      Sex: Male      Is Non- : Yes      Diabetic: No      Tobacco smoker: No      Systolic Blood Pressure: 128 mmHg      Is BP treated: No      HDL Cholesterol: 36 mg/dL      Total Cholesterol: 186 mg/dL        2/12/2024   Contraception/Family Planning   Questions about contraception or family planning (!) DECLINE     Reviewed and updated as needed this visit by Provider   Tobacco  Allergies  Meds  Problems  Med Hx  Surg Hx  Fam Hx                   Objective    Exam  /88   Pulse 74   Temp 98.3  F (36.8  C) (Oral)   Resp 16   Ht 1.797 m (5' 10.75\")   Wt 92.9 kg (204 lb 14.4 oz)   SpO2 98%   BMI 28.78 kg/m     Estimated body mass index is 28.78 kg/m  as calculated from the following:    Height as of this encounter: 1.797 m (5' 10.75\").    Weight as of this encounter: 92.9 kg (204 lb 14.4 oz).    Wt Readings from Last 5 Encounters:   02/12/24 92.9 kg (204 lb 14.4 oz)   05/19/23 88.1 kg (194 lb 3.2 oz)   03/31/23 92.7 kg (204 lb 4.8 oz)   03/01/23 94.3 kg (208 lb)   02/22/23 93.5 kg (206 lb 3.2 oz)         Physical Exam  GENERAL: alert and no distress  EYES: Eyes grossly normal to inspection, conjunctivae and sclerae normal  HENT: ear canals and TM's normal, mouth without ulcers or lesions  NECK: no adenopathy, no asymmetry, masses, or scars  RESP: lungs clear to auscultation - no rales, rhonchi or wheezes  CV: regular rate and rhythm, normal S1 S2, no S3 or S4, no murmur, click or rub, no peripheral edema  ABDOMEN: soft, nontender, no hepatosplenomegaly, no masses and bowel sounds normal  MS: no gross " musculoskeletal defects noted, no edema  NEURO: Normal strength and tone, mentation intact and speech normal  PSYCH: mentation appears normal, affect normal/bright      Signed Electronically by: Pamela Marion NP

## 2024-02-12 NOTE — PATIENT INSTRUCTIONS
"Eating Healthy Foods: Care Instructions  With every meal, you can make healthy food choices. Try to eat a variety of fruits, vegetables, whole grains, lean proteins, and low-fat dairy products. This can help you get the right balance of nutrients, including vitamins and minerals. Small changes add up over time. You can start by adding one healthy food to your meals each day.    Try to make half your plate fruits and vegetables, one-fourth whole grains, and one-fourth lean proteins. Try including dairy with your meals.   Eat more fruits and vegetables. Try to have them with most meals and snacks.   Foods for healthy eating    Fruits    These can be fresh, frozen, canned, or dried.  Try to choose whole fruit rather than fruit juice.  Eat a variety of colors.    Vegetables    These can be fresh, frozen, canned, or dried.  Beans, peas, and lentils count too.    Whole grains    Choose whole-grain breads, cereals, and noodles.  Try brown rice.    Lean proteins    These can include lean meat, poultry, fish, and eggs.  You can also have tofu, beans, peas, lentils, nuts, and seeds.    Dairy    Try milk, yogurt, and cheese.  Choose low-fat or fat-free when you can.  If you need to, use lactose-free milk or fortified plant-based milk products, such as soy milk.    Water    Drink water when you're thirsty.  Limit sugar-sweetened drinks, including soda, fruit drinks, and sports drinks.  Where can you learn more?  Go to https://www.[employer].net/patiented  Enter T756 in the search box to learn more about \"Eating Healthy Foods: Care Instructions.\"  Current as of: February 28, 2023               Content Version: 13.8    1516-0243 [employer].   Care instructions adapted under license by your healthcare professional. If you have questions about a medical condition or this instruction, always ask your healthcare professional. [employer] disclaims any warranty or liability for your use of this " information.      Learning About Stress  What is stress?     Stress is your body's response to a hard situation. Your body can have a physical, emotional, or mental response. Stress is a fact of life for most people, and it affects everyone differently. What causes stress for you may not be stressful for someone else.  A lot of things can cause stress. You may feel stress when you go on a job interview, take a test, or run a race. This kind of short-term stress is normal and even useful. It can help you if you need to work hard or react quickly. For example, stress can help you finish an important job on time.  Long-term stress is caused by ongoing stressful situations or events. Examples of long-term stress include long-term health problems, ongoing problems at work, or conflicts in your family. Long-term stress can harm your health.  How does stress affect your health?  When you are stressed, your body responds as though you are in danger. It makes hormones that speed up your heart, make you breathe faster, and give you a burst of energy. This is called the fight-or-flight stress response. If the stress is over quickly, your body goes back to normal and no harm is done.  But if stress happens too often or lasts too long, it can have bad effects. Long-term stress can make you more likely to get sick, and it can make symptoms of some diseases worse. If you tense up when you are stressed, you may develop neck, shoulder, or low back pain. Stress is linked to high blood pressure and heart disease.  Stress also harms your emotional health. It can make you gonzáles, tense, or depressed. Your relationships may suffer, and you may not do well at work or school.  What can you do to manage stress?  You can try these things to help manage stress:   Do something active. Exercise or activity can help reduce stress. Walking is a great way to get started. Even everyday activities such as housecleaning or yard work can help.  Try  yoga or soniya chi. These techniques combine exercise and meditation. You may need some training at first to learn them.  Do something you enjoy. For example, listen to music or go to a movie. Practice your hobby or do volunteer work.  Meditate. This can help you relax, because you are not worrying about what happened before or what may happen in the future.  Do guided imagery. Imagine yourself in any setting that helps you feel calm. You can use online videos, books, or a teacher to guide you.  Do breathing exercises. For example:  From a standing position, bend forward from the waist with your knees slightly bent. Let your arms dangle close to the floor.  Breathe in slowly and deeply as you return to a standing position. Roll up slowly and lift your head last.  Hold your breath for just a few seconds in the standing position.  Breathe out slowly and bend forward from the waist.  Let your feelings out. Talk, laugh, cry, and express anger when you need to. Talking with supportive friends or family, a counselor, or a nunu leader about your feelings is a healthy way to relieve stress. Avoid discussing your feelings with people who make you feel worse.  Write. It may help to write about things that are bothering you. This helps you find out how much stress you feel and what is causing it. When you know this, you can find better ways to cope.  What can you do to prevent stress?  You might try some of these things to help prevent stress:  Manage your time. This helps you find time to do the things you want and need to do.  Get enough sleep. Your body recovers from the stresses of the day while you are sleeping.  Get support. Your family, friends, and community can make a difference in how you experience stress.  Limit your news feed. Avoid or limit time on social media or news that may make you feel stressed.  Do something active. Exercise or activity can help reduce stress. Walking is a great way to get started.  Where  "can you learn more?  Go to https://www.[employer].net/patiented  Enter N032 in the search box to learn more about \"Learning About Stress.\"  Current as of: February 26, 2023               Content Version: 13.8    9012-1849 [employer].   Care instructions adapted under license by your healthcare professional. If you have questions about a medical condition or this instruction, always ask your healthcare professional. [employer] disclaims any warranty or liability for your use of this information.      Substance Use Disorder: Care Instructions  Overview     You can improve your life and health by stopping your use of alcohol or drugs. When you don't drink or use drugs, you may feel and sleep better. You may get along better with your family, friends, and coworkers. There are medicines and programs that can help with substance use disorder.  How can you care for yourself at home?  Here are some ways to help you stay sober and prevent relapse.  If you have been given medicine to help keep you sober or reduce your cravings, be sure to take it exactly as prescribed.  Talk to your doctor about programs that can help you stop using drugs or drinking alcohol.  Do not keep alcohol or drugs in your home.  Plan ahead. Think about what you'll say if other people ask you to drink or use drugs. Try not to spend time with people who drink or use drugs.  Use the time and money spent on drinking or drugs to do something that's important to you.  Preventing a relapse  Have a plan to deal with relapse. Learn to recognize changes in your thinking that lead you to drink or use drugs. Get help before you start to drink or use drugs again.  Try to stay away from situations, friends, or places that may lead you to drink or use drugs.  If you feel the need to drink alcohol or use drugs again, seek help right away. Call a trusted friend or family member. Some people get support from organizations such as Narcotics " Anonymous or [facility] [employer] or from treatment facilities.  If you relapse, get help as soon as you can. Some people make a plan with another person that outlines what they want that person to do for them if they relapse. The plan usually includes how to handle the relapse and who to notify in case of relapse.  Don't give up. Remember that a relapse doesn't mean that you have failed. Use the experience to learn the triggers that lead you to drink or use drugs. Then quit again. Recovery is a lifelong process. Many people have several relapses before they are able to quit for good.  Follow-up care is a key part of your treatment and safety. Be sure to make and go to all appointments, and call your doctor if you are having problems. It's also a good idea to know your test results and keep a list of the medicines you take.  When should you call for help?   Call 911  anytime you think you may need emergency care. For example, call if you or someone else:    Has overdosed or has withdrawal signs. Be sure to tell the emergency workers that you are or someone else is using or trying to quit using drugs. Overdose or withdrawal signs may include:  Losing consciousness.  Seizure.  Seeing or hearing things that aren't there (hallucinations).     Is thinking or talking about suicide or harming others.   Where to get help 24 hours a day, 7 days a week   If you or someone you know talks about suicide, self-harm, a mental health crisis, a substance use crisis, or any other kind of emotional distress, get help right away. You can:    Call the Suicide and Crisis Lifeline at 988.     Call 1-933-742-TALK (1-656.452.1912).     Text HOME to 803333 to access the Crisis Text Line.   Consider saving these numbers in your phone.  Go to Gymtrack.org for more information or to chat online.  Call your doctor now or seek immediate medical care if:    You are having withdrawal symptoms. These may include nausea or vomiting, sweating, shakiness,  "and anxiety.   Watch closely for changes in your health, and be sure to contact your doctor if:    You have a relapse.     You need more help or support to stop.   Where can you learn more?  Go to https://www.Dopios.net/patiented  Enter H573 in the search box to learn more about \"Substance Use Disorder: Care Instructions.\"  Current as of: March 21, 2023               Content Version: 13.8    4088-6595 [employer].   Care instructions adapted under license by your healthcare professional. If you have questions about a medical condition or this instruction, always ask your healthcare professional. [employer] disclaims any warranty or liability for your use of this information.      Preventive Care Advice   This is general advice given by our system to help you stay healthy. However, your care team may have specific advice just for you. Please talk to your care team about your preventive care needs.  Nutrition  Eat 5 or more servings of fruits and vegetables each day.  Try wheat bread, brown rice and whole grain pasta (instead of white bread, rice, and pasta).  Get enough calcium and vitamin D. Check the label on foods and aim for 100% of the RDA (recommended daily allowance).  Lifestyle  Exercise at least 150 minutes each week  (30 minutes a day, 5 days a week).  Do muscle strengthening activities 2 days a week. These help control your weight and prevent disease.  No smoking.  Wear sunscreen to prevent skin cancer.  Have a dental exam and cleaning every 6 months.  Yearly exams  See your health care team every year to talk about:  Any changes in your health.  Any medicines your care team has prescribed.  Preventive care, family planning, and ways to prevent chronic diseases.  Shots (vaccines)   HPV shots (up to age 26), if you've never had them before.  Hepatitis B shots (up to age 59), if you've never had them before.  COVID-19 shot: Get this shot when it's due.  Flu shot: Get a flu shot " every year.  Tetanus shot: Get a tetanus shot every 10 years.  Pneumococcal, hepatitis A, and RSV shots: Ask your care team if you need these based on your risk.  Shingles shot (for age 50 and up)  General health tests  Diabetes screening:  Starting at age 35, Get screened for diabetes at least every 3 years.  If you are younger than age 35, ask your care team if you should be screened for diabetes.  Cholesterol test: At age 39, start having a cholesterol test every 5 years, or more often if advised.  Bone density scan (DEXA): At age 50, ask your care team if you should have this scan for osteoporosis (brittle bones).  Hepatitis C: Get tested at least once in your life.  STIs (sexually transmitted infections)  Before age 24: Ask your care team if you should be screened for STIs.  After age 24: Get screened for STIs if you're at risk. You are at risk for STIs (including HIV) if:  You are sexually active with more than one person.  You don't use condoms every time.  You or a partner was diagnosed with a sexually transmitted infection.  If you are at risk for HIV, ask about PrEP medicine to prevent HIV.  Get tested for HIV at least once in your life, whether you are at risk for HIV or not.  Cancer screening tests  Cervical cancer screening: If you have a cervix, begin getting regular cervical cancer screening tests starting at age 21.  Breast cancer scan (mammogram): If you've ever had breasts, begin having regular mammograms starting at age 40. This is a scan to check for breast cancer.  Colon cancer screening: It is important to start screening for colon cancer at age 45.  Have a colonoscopy test every 10 years (or more often if you're at risk) Or, ask your provider about stool tests like a FIT test every year or Cologuard test every 3 years.  To learn more about your testing options, visit:   https://[email or web address]/933799.pdf.  For help making a decision, visit:   https://bit.ly/rf87771.  Prostate cancer screening  test: If you have a prostate, ask your care team if a prostate cancer screening test (PSA) at age 55 is right for you.  Lung cancer screening: If you are a current or former smoker ages 50 to 80, ask your care team if ongoing lung cancer screenings are right for you.  For informational purposes only. Not to replace the advice of your health care provider. Copyright   2023 [facility]. All rights reserved. Clinically reviewed by the [employer] [facility]itions Program. [employer] 747495 - REV 01/24.

## 2024-02-13 PROBLEM — K76.0 HEPATIC STEATOSIS: Status: ACTIVE | Noted: 2023-01-27

## 2024-02-13 PROBLEM — E78.2 MIXED HYPERLIPIDEMIA: Status: ACTIVE | Noted: 2024-02-13

## 2024-02-13 LAB
ALBUMIN SERPL BCG-MCNC: 4.3 G/DL (ref 3.5–5.2)
ALP SERPL-CCNC: 87 U/L (ref 40–150)
ALT SERPL W P-5'-P-CCNC: 25 U/L (ref 0–70)
AST SERPL W P-5'-P-CCNC: 55 U/L (ref 0–45)
BILIRUB DIRECT SERPL-MCNC: <0.2 MG/DL (ref 0–0.3)
BILIRUB SERPL-MCNC: 0.2 MG/DL
CHOLEST SERPL-MCNC: 186 MG/DL
FASTING STATUS PATIENT QL REPORTED: ABNORMAL
HDLC SERPL-MCNC: 36 MG/DL
LDLC SERPL CALC-MCNC: 129 MG/DL
NONHDLC SERPL-MCNC: 150 MG/DL
PROT SERPL-MCNC: 7.9 G/DL (ref 6.4–8.3)
PSA SERPL DL<=0.01 NG/ML-MCNC: 0.84 NG/ML (ref 0–2.5)
TRIGL SERPL-MCNC: 104 MG/DL
VIT D+METAB SERPL-MCNC: 26 NG/ML (ref 20–50)

## 2024-02-13 NOTE — ASSESSMENT & PLAN NOTE
He just completed a high-dose vitamin D regimen.  He is advised to start vitamin D 2000 international units daily.

## 2024-02-13 NOTE — ASSESSMENT & PLAN NOTE
Diastolic reading is elevated.  His wife is an RN and is able to monitor his blood pressure. If readings are >140/90, he should be re-evaluated

## 2024-02-13 NOTE — ASSESSMENT & PLAN NOTE
10-year ASCVD risk is 4.5%.  No medication is indicated at this time but he is encouraged to resume regular physical activity and a low-cholesterol diet.

## 2024-09-17 NOTE — LETTER
February 13, 2024      Christopher Bustamante  [address] [place] [address]        Dear ,    We are writing to inform you of your test results.    Your A1c is still in a prediabetes range and has come up just a little bit since last year.  I think if you return to your exercise regimen and eating better this will improve.    Your liver enzymes also continue to improve.    The vitamin D level is on the low end of normal.  Please start an over-the-counter supplement of vitamin D 2000 international units daily.    Cholesterol is elevated.  When I calculate your 10-year risk of heart disease, this is considered to be low at 4.5%.  No medication is indicated at this time but it is important that you resume a more heart healthy diet and exercise routine.      Resulted Orders   Hemoglobin A1c   Result Value Ref Range    Hemoglobin A1C 5.9 (H) 0.0 - 5.6 %      Comment:      Normal <5.7%   Prediabetes 5.7-6.4%    Diabetes 6.5% or higher     Note: Adopted from ADA consensus guidelines.   Hepatic panel (Albumin, ALT, AST, Bili, Alk Phos, TP)   Result Value Ref Range    Protein Total 7.9 6.4 - 8.3 g/dL    Albumin 4.3 3.5 - 5.2 g/dL    Bilirubin Total 0.2 <=1.2 mg/dL    Alkaline Phosphatase 87 40 - 150 U/L      Comment:      Reference intervals for this test were updated on 11/14/2023 to more accurately reflect our healthy population. There may be differences in the flagging of prior results with similar values performed with this method. Interpretation of those prior results can be made in the context of the updated reference intervals.    AST 55 (H) 0 - 45 U/L      Comment:      Reference intervals for this test were updated on 6/12/2023 to more accurately reflect our healthy population. There may be differences in the flagging of prior results with similar values performed with this method. Interpretation of those prior results can be made in the context of the updated reference intervals.    ALT 25 0 - 70 U/L  Patient transported to X-ray     Daniel Franco  09/17/24 [id number]          Comment:      Reference intervals for this test were updated on 6/12/2023 to more accurately reflect our healthy population. There may be differences in the flagging of prior results with similar values performed with this method. Interpretation of those prior results can be made in the context of the updated reference intervals.      Bilirubin Direct <0.20 0.00 - 0.30 mg/dL   Vitamin D Deficiency   Result Value Ref Range    Vitamin D, Total (25-Hydroxy) 26 20 - 50 ng/mL      Comment:      optimum levels    Narrative    Season, race, dietary intake, and treatment affect the concentration of 25-hydroxy-Vitamin D. Values may decrease during winter months and increase during summer months.    Vitamin D determination is routinely performed by an immunoassay specific for 25 hydroxyvitamin D3.  If an individual is on vitamin D2(ergocalciferol) supplementation, please specify 25 OH vitamin D2 and D3 level determination by LCMSMS test VITD23.     Lipid panel reflex to direct LDL Fasting   Result Value Ref Range    Cholesterol 186 <200 mg/dL    Triglycerides 104 <150 mg/dL    Direct Measure HDL 36 (L) >=40 mg/dL    LDL Cholesterol Calculated 129 (H) <=100 mg/dL    Non HDL Cholesterol 150 (H) <130 mg/dL    Patient Fasting > 8hrs? Unknown     Narrative    Cholesterol  Desirable:  <200 mg/dL    Triglycerides  Normal:  Less than 150 mg/dL  Borderline High:  150-199 mg/dL  High:  200-499 mg/dL  Very High:  Greater than or equal to 500 mg/dL    Direct Measure HDL  Female:  Greater than or equal to 50 mg/dL   Male:  Greater than or equal to 40 mg/dL    LDL Cholesterol  Desirable:  <100mg/dL  Above Desirable:  100-129 mg/dL   Borderline High:  130-159 mg/dL   High:  160-189 mg/dL   Very High:  >= 190 mg/dL    Non HDL Cholesterol  Desirable:  130 mg/dL  Above Desirable:  130-159 mg/dL  Borderline High:  160-189 mg/dL  High:  190-219 mg/dL  Very High:  Greater than or equal to 220 mg/dL   PSA, screen   Result Value Ref Range     Prostate Specific Antigen Screen 0.84 0.00 - 2.50 ng/mL    Narrative    This result is obtained using the Roche Elecsys total PSA method on the george [id number] immunoassay analyzer. Results obtained with different assay methods or kits cannot be used interchangeably.       If you have any questions or concerns, please call the clinic at the number listed above.       Sincerely,      Pamela Marion, DNP, APRN, CNP

## 2024-11-01 ENCOUNTER — HOSPITAL ENCOUNTER (EMERGENCY)
Facility: HOSPITAL | Age: 49
Discharge: HOME | End: 2024-11-01
Attending: EMERGENCY MEDICINE | Admitting: EMERGENCY MEDICINE

## 2024-11-01 VITALS
HEART RATE: 59 BPM | DIASTOLIC BLOOD PRESSURE: 96 MMHG | HEIGHT: 71 IN | TEMPERATURE: 96 F | RESPIRATION RATE: 16 BRPM | WEIGHT: 200.2 LBS | BODY MASS INDEX: 28.03 KG/M2 | SYSTOLIC BLOOD PRESSURE: 155 MMHG | OXYGEN SATURATION: 99 %

## 2024-11-01 DIAGNOSIS — S39.012A STRAIN OF LUMBAR REGION, INITIAL ENCOUNTER: ICD-10-CM

## 2024-11-01 DIAGNOSIS — M54.50 ACUTE LOW BACK PAIN WITHOUT SCIATICA, UNSPECIFIED BACK PAIN LATERALITY: ICD-10-CM

## 2024-11-01 PROCEDURE — 99284 EMERGENCY DEPT VISIT MOD MDM: CPT

## 2024-11-01 RX ORDER — CYCLOBENZAPRINE HCL 10 MG
10 TABLET ORAL 3 TIMES DAILY PRN
Qty: 20 TABLET | Refills: 0 | Status: SHIPPED | OUTPATIENT
Start: 2024-11-01 | End: 2024-11-01

## 2024-11-01 RX ORDER — NAPROXEN 500 MG/1
500 TABLET ORAL 2 TIMES DAILY WITH MEALS
Qty: 24 TABLET | Refills: 0 | Status: SHIPPED | OUTPATIENT
Start: 2024-11-01 | End: 2024-11-01

## 2024-11-01 RX ORDER — NAPROXEN 500 MG/1
500 TABLET ORAL 2 TIMES DAILY WITH MEALS
Qty: 24 TABLET | Refills: 0 | Status: SHIPPED | OUTPATIENT
Start: 2024-11-01

## 2024-11-01 RX ORDER — CYCLOBENZAPRINE HCL 10 MG
10 TABLET ORAL 3 TIMES DAILY PRN
Qty: 20 TABLET | Refills: 0 | Status: SHIPPED | OUTPATIENT
Start: 2024-11-01

## 2024-11-01 ASSESSMENT — ENCOUNTER SYMPTOMS: BACK PAIN: 1

## 2024-11-01 ASSESSMENT — COLUMBIA-SUICIDE SEVERITY RATING SCALE - C-SSRS
2. HAVE YOU ACTUALLY HAD ANY THOUGHTS OF KILLING YOURSELF IN THE PAST MONTH?: NO
6. HAVE YOU EVER DONE ANYTHING, STARTED TO DO ANYTHING, OR PREPARED TO DO ANYTHING TO END YOUR LIFE?: NO
1. IN THE PAST MONTH, HAVE YOU WISHED YOU WERE DEAD OR WISHED YOU COULD GO TO SLEEP AND NOT WAKE UP?: NO

## 2024-11-01 ASSESSMENT — ACTIVITIES OF DAILY LIVING (ADL): ADLS_ACUITY_SCORE: 0

## 2024-11-01 NOTE — Clinical Note
Christopher Bustamante was seen and treated in our emergency department on 11/1/2024.  He may return to work on 11/04/2024.  Recommend no lifting greater than 20 pounds.  No frequent bending.  No driving more than 1 hour without frequent stops to allow stretching.  Restrictions are in effect for 1 week     If you have any questions or concerns, please don't hesitate to call.      Albert Galeana MD

## 2024-11-02 NOTE — ED PROVIDER NOTES
EMERGENCY DEPARTMENT ENCOUNTER      NAME: Christopher Bustamante  AGE: 49 year old male  YOB: 1975  MRN: [id number]  EVALUATION DATE & TIME: 11/1/2024  7:37 PM    PCP: Arina Aldridge    ED PROVIDER: Albert Galeana MD      Chief Complaint   Patient presents with    Back Pain         FINAL IMPRESSION:  1. Acute low back pain without sciatica, unspecified back pain laterality    2. Strain of lumbar region, initial encounter          ED COURSE & MEDICAL DECISION MAKING:    Pertinent Labs & Imaging studies reviewed. (See chart for details)  49 year old male presents to the Emergency Department for evaluation of back pain that occurred immediately after lifting heavy object at work    ED Course as of 11/01/24 2025 Fri Nov 01, 2024 1946 I met patient and performed my initial exam   1958 Patient presents with low back injury that occurred immediately after lifting heavy beam.  No radicular symptoms.  No fever.  No weakness.  No tenderness.  No red flag symptoms that would mandate emergent imaging   1958 He has tried Tylenol 1 time   1958 Plan for discharge home with Flexeril, naproxen, recommend Tylenol, work restrictions, follow-up with [facility]   2004 Patient requesting meds to be sent to [facility].   2004 Patient history most likely lumbar sprain.  No weakness on exam.  No pulsatile mass.  Highly doubt fracture of no midline spine tenderness.  Doubt AAA.  Doubt cauda equina.  Doubt epidural abscess.   2005 Discharge home Flexeril, naproxen, referral to [facility]       Medical Decision Making  Obtained supplemental history:Supplemental history obtained?: Documented in chart  Reviewed external records: External records reviewed?: Documented in chart and Outpatient Record: Patient was seen at [facility] on 02/12/24  Care impacted by chronic illness:Hyperlipidemia  Did you consider but not order tests?: Work up considered but not performed and documented in chart, if applicable  Did  "you interpret images independently?: Independent interpretation of ECG and images noted in documentation, when applicable.  Consultation discussion with other provider:Did you involve another provider (consultant, , pharmacy, etc.)?: No  Discharge. I prescribed additional prescription strength medication(s) as charted. N/A.    MIPS: Not Applicable            At the conclusion of the encounter I discussed the results of all of the tests and the disposition. The questions were answered. The patient or family acknowledged understanding and was agreeable with the care plan.         MEDICATIONS GIVEN IN THE EMERGENCY:  Medications - No data to display    NEW PRESCRIPTIONS STARTED AT TODAY'S ER VISIT  Discharge Medication List as of 11/1/2024  8:05 PM             =================================================================    TRIAGE ASSESSMENT:  Patient was lifting a beam two days ago and hurt his back. Pain in his lumbar      Triage Assessment (Adult)       Row Name 11/01/24 1933          Triage Assessment    Airway WDL WDL        Respiratory WDL    Respiratory WDL WDL        Skin Circulation/Temperature WDL    Skin Circulation/Temperature WDL WDL        Cardiac WDL    Cardiac WDL WDL        Peripheral/Neurovascular WDL    Peripheral Neurovascular WDL WDL        Cognitive/Neuro/Behavioral WDL    Cognitive/Neuro/Behavioral WDL WDL                          HPI    Patient information was obtained from: Patient     Use of : N/A       Christopher Bustamante is a 49 year old male with a pertinent history of HLD who presents to this ED via walk-in for evaluation of mid lower back pain.     The patient reports he was lifting a beam two days ago (10/30/24), and hurt his back. Patient is a  and lifts heavy materials. He states he \"immediately\" felt the pain after lifting the beam, but mentioned that the pain was not \"that bad\" yesterday. However, at night, patient cannot turn or move around comfortably. The back " pain is worse when leaning forward or leaning to the right side. Patient also endorses some mild pain that warps around to the front when walking around. Patient's wife gave him 2 doses of tylenol around 7:06 PM. He also mentions that he takes vitamin D daily. Patient endorses frequent urination at night.  No other complaints at this time.     REVIEW OF SYSTEMS   Review of Systems   Musculoskeletal:  Positive for back pain.        PAST MEDICAL HISTORY:  History reviewed. No pertinent past medical history.    PAST SURGICAL HISTORY:  No past surgical history on file.        CURRENT MEDICATIONS:    cyclobenzaprine (FLEXERIL) 10 MG tablet  naproxen (NAPROSYN) 500 MG tablet  azithromycin (ZITHROMAX) 500 MG tablet  FLUCELVAX QUADRIVALENT 0.5 ML JENNIFER  mefloquine (LARIAM) 250 MG tablet  PFIZER COVID-19 VAC BIVALENT 30 MCG/0.3ML injection  vitamin D2 (ERGOCALCIFEROL) 02237 units (1250 mcg) capsule        ALLERGIES:  No Known Allergies    FAMILY HISTORY:  No family history on file.    SOCIAL HISTORY:   Social History     Socioeconomic History    Marital status:    Tobacco Use    Smoking status: Never     Passive exposure: Never    Smokeless tobacco: Never   Vaping Use    Vaping status: Never Used   Substance and Sexual Activity    Alcohol use: Yes     Comment: socially     Social Drivers of Health     Financial Resource Strain: High Risk (2/12/2024)    Financial Resource Strain     Within the past 12 months, have you or your family members you live with been unable to get utilities (heat, electricity) when it was really needed?: Yes   Food Insecurity: High Risk (2/12/2024)    Food Insecurity     Within the past 12 months, did you worry that your food would run out before you got money to buy more?: Yes     Within the past 12 months, did the food you bought just not last and you didn t have money to get more?: Yes   Transportation Needs: High Risk (2/12/2024)    Transportation Needs     Within the past 12 months, has  "lack of transportation kept you from medical appointments, getting your medicines, non-medical meetings or appointments, work, or from getting things that you need?: Yes   Physical Activity: Insufficiently Active (2/12/2024)    Exercise Vital Sign     Days of Exercise per Week: 2 days     Minutes of Exercise per Session: 30 min   Stress: No Stress Concern Present (2/12/2024)    [facility] Occupational Health - Occupational Stress Questionnaire     Feeling of Stress : Only a little    Received from [facility] [employer] [facility]    Social Connections   Interpersonal Safety: Low Risk  (2/12/2024)    Interpersonal Safety     Do you feel physically and emotionally safe where you currently live?: Yes     Within the past 12 months, have you been hit, slapped, kicked or otherwise physically hurt by someone?: No     Within the past 12 months, have you been humiliated or emotionally abused in other ways by your partner or ex-partner?: No   Housing Stability: High Risk (2/12/2024)    Housing Stability     Do you have housing? : Yes     Are you worried about losing your housing?: Yes       VITALS:  BP (!) 155/96   Pulse 59   Temp (!) 96  F (35.6  C)   Resp 16   Ht 1.803 m (5' 11\")   Wt 90.8 kg (200 lb 3.2 oz)   SpO2 99%   BMI 27.92 kg/m      PHYSICAL EXAM      Vitals: BP (!) 155/96   Pulse 59   Temp (!) 96  F (35.6  C)   Resp 16   Ht 1.803 m (5' 11\")   Wt 90.8 kg (200 lb 3.2 oz)   SpO2 99%   BMI 27.92 kg/m    General: Appears in no acute distress, awake, alert, interactive.  Eyes: Conjunctivae non-injected. Sclera anicteric.  HENT: Atraumatic.  Neck: Supple.  Respiratory/Chest: Respiration unlabored.  Abdomen: non distended  Musculoskeletal: Normal extremities. No edema or erythema. Stiff with range of motion.   Back:  Normal gait, able to stand on toes and heels, normal bilateral ankle dorsiflexion/plantar flexion/inversion/eversion/1st toe extension, normal bilateral patellar and " Achilles reflexes. No midline spine tenderness. No step offs. No crepitus. No visible deformity to spine. No pulsatile abdominal masses. Light touch sensation intact to LE.     Skin: Normal color. No rash or diaphoresis.  Neurologic: Face symmetric, moves all extremities spontaneously. Speech clear.  Psychiatric: Oriented to person, place, and time. Affect appropriate.    LAB:  All pertinent labs reviewed and interpreted.       RADIOLOGY:  Reviewed all pertinent imaging. Please see official radiology report.  No orders to display             I, Maryann Wolf, am serving as a scribe to document services personally performed by Albert Galeana MD based on my observation and the provider's statements to me. I, Albert Galeana MD, attest that Maryann Wolf is acting in a scribe capacity, has observed my performance of the services and has documented them in accordance with my direction.    Albert Galeana MD  [facility] EMERGENCY DEPARTMENT  [address] [place] [id number]  [phone]      Albert Galeana MD  11/01/24 [id number]

## 2024-11-02 NOTE — DISCHARGE INSTRUCTIONS
Recommend naproxen twice a day.  Recommend Flexeril 3 times a day as needed for stiffness.  You can combine this with Tylenol.  Follow-up with your doctor as well as [facility].  I did create a referral.

## 2024-11-02 NOTE — ED TRIAGE NOTES
Patient was lifting a beam two days ago and hurt his back. Pain in his lumbar      Triage Assessment (Adult)       Row Name 11/01/24 1933          Triage Assessment    Airway WDL WDL        Respiratory WDL    Respiratory WDL WDL        Skin Circulation/Temperature WDL    Skin Circulation/Temperature WDL WDL        Cardiac WDL    Cardiac WDL WDL        Peripheral/Neurovascular WDL    Peripheral Neurovascular WDL WDL        Cognitive/Neuro/Behavioral WDL    Cognitive/Neuro/Behavioral WDL WDL

## 2024-11-04 ENCOUNTER — PATIENT OUTREACH (OUTPATIENT)
Dept: FAMILY MEDICINE | Facility: CLINIC | Age: 49
End: 2024-11-04
Payer: COMMERCIAL

## 2024-11-06 NOTE — TELEPHONE ENCOUNTER
Left 2nd message for patient to return call to clinic.  Closing encounter now, if patient returns call please transfer to nursing team.

## 2025-01-13 ENCOUNTER — PATIENT OUTREACH (OUTPATIENT)
Dept: CARE COORDINATION | Facility: CLINIC | Age: 50
End: 2025-01-13
Payer: COMMERCIAL

## 2025-01-27 ENCOUNTER — PATIENT OUTREACH (OUTPATIENT)
Dept: CARE COORDINATION | Facility: CLINIC | Age: 50
End: 2025-01-27
Payer: COMMERCIAL

## 2025-02-05 ENCOUNTER — TELEPHONE (OUTPATIENT)
Dept: FAMILY MEDICINE | Facility: CLINIC | Age: 50
End: 2025-02-05
Payer: COMMERCIAL

## 2025-02-05 NOTE — TELEPHONE ENCOUNTER
Reason for Call:  Appointment Request    Patient requesting this type of appt:  [facility]    Requested provider:  Whoever is available     Reason patient unable to be scheduled: Not within requested timeframe    When does patient want to be seen/preferred time: 1-2 weeks    Comments: Patient is leaving on 02/20/2025, next opening is 02/24/2025. Needs to be seen before then.    Okay to leave a detailed message?: Yes at Cell number on file:    Telephone Information:   Mobile [phone]       Call taken on 2/5/2025 at 8:34 AM by Pamela Asif

## 2025-02-05 NOTE — TELEPHONE ENCOUNTER
I called and spoke to the patient. He is needing vaccines for Travel so I referred him to the [facility] clinic and gave him their number to call for an appointment.    Keri Reyes [facility]

## 2025-02-06 ENCOUNTER — OFFICE VISIT (OUTPATIENT)
Dept: OPHTHALMOLOGY | Facility: CLINIC | Age: 50
End: 2025-02-06
Payer: COMMERCIAL

## 2025-02-06 DIAGNOSIS — H04.123 DRY EYE SYNDROME, BILATERAL: Primary | ICD-10-CM

## 2025-02-06 DIAGNOSIS — H52.203 MYOPIA OF BOTH EYES WITH ASTIGMATISM AND PRESBYOPIA: ICD-10-CM

## 2025-02-06 DIAGNOSIS — H52.4 MYOPIA OF BOTH EYES WITH ASTIGMATISM AND PRESBYOPIA: ICD-10-CM

## 2025-02-06 DIAGNOSIS — H52.13 MYOPIA OF BOTH EYES WITH ASTIGMATISM AND PRESBYOPIA: ICD-10-CM

## 2025-02-06 ASSESSMENT — CONF VISUAL FIELD
OS_SUPERIOR_TEMPORAL_RESTRICTION: 0
OS_SUPERIOR_NASAL_RESTRICTION: 0
OS_NORMAL: 1
OD_INFERIOR_NASAL_RESTRICTION: 0
OS_INFERIOR_TEMPORAL_RESTRICTION: 0
OD_SUPERIOR_TEMPORAL_RESTRICTION: 0
OD_INFERIOR_TEMPORAL_RESTRICTION: 0
OD_SUPERIOR_NASAL_RESTRICTION: 0
OD_NORMAL: 1
OS_INFERIOR_NASAL_RESTRICTION: 0

## 2025-02-06 ASSESSMENT — REFRACTION_MANIFEST
OS_AXIS: 007
OS_SPHERE: -1.25
OD_ADD: +1.75
OS_ADD: +1.75
OD_CYLINDER: +0.75
OD_AXIS: 007
OS_CYLINDER: +1.25
OD_SPHERE: -0.75

## 2025-02-06 ASSESSMENT — SLIT LAMP EXAM - LIDS
COMMENTS: 1+ MEIBOMIAN GLAND DYSFUNCTION
COMMENTS: 1+ MEIBOMIAN GLAND DYSFUNCTION

## 2025-02-06 ASSESSMENT — EXTERNAL EXAM - LEFT EYE: OS_EXAM: NORMAL

## 2025-02-06 ASSESSMENT — CUP TO DISC RATIO
OD_RATIO: 0.35
OS_RATIO: 0.4

## 2025-02-06 ASSESSMENT — EXTERNAL EXAM - RIGHT EYE: OD_EXAM: NORMAL

## 2025-02-06 ASSESSMENT — VISUAL ACUITY
OS_SC+: -1
OS_SC: 20/60
OD_SC: 20/40
OD_SC+: -1
OS_SC: J1-
OS_PH_SC: 20/25
OS_PH_SC+: -3
OD_PH_SC: 20/25
METHOD: SNELLEN - LINEAR
OD_SC: J2

## 2025-02-06 ASSESSMENT — TONOMETRY
OS_IOP_MMHG: 20
OD_IOP_MMHG: 18
IOP_METHOD: APPLANATION

## 2025-02-06 NOTE — PROGRESS NOTES
Current Eye Medications:  Systane both eyes twice a day or three times a day.       Subjective:  Comprehensive Eye Exam.  The patient complains of dryness in both eyes for the past 5 years.  Systane helps for a short time, but it doesn't last.  No vision changes or concern, except slight decreasing distance vision.  Reading vision is good without correction.      Last eye exam:  one year ago.  He was given a glasses prescription, but was told it was optional.       No previous eye injuries or surgeries.      Objective:  See Ophthalmology Exam.       Assessment:  Christopher Bustamante is a 49 year old male who presents with:   Encounter Diagnoses   Name Primary?    Dry eye syndrome, bilateral Yes    Myopia of both eyes with astigmatism and presbyopia        Plan:  Try substituting your artificial tears for a gel tear up to four times a day (like Refresh Liquigel or GenTeal Gel Tears)     Use warm compresses daily on the eyes     Glasses prescription given - recommend wearing glasses to sharpen up the distance (when driving)    Yossi Bolden MD  [phone]

## 2025-02-06 NOTE — LETTER
2/6/2025      Christopher Bustamante  [address] [place]  [place] [address]      Dear Colleague,    Thank you for referring your patient, Christopher Bustamante, to the [facility]. Please see a copy of my visit note below.     Current Eye Medications:  Systane both eyes twice a day or three times a day.       Subjective:  Comprehensive Eye Exam.  The patient complains of dryness in both eyes for the past 5 years.  Systane helps for a short time, but it doesn't last.  No vision changes or concern, except slight decreasing distance vision.  Reading vision is good without correction.      Last eye exam:  one year ago.  He was given a glasses prescription, but was told it was optional.        Objective:  See Ophthalmology Exam.       Assessment:  Christopher Bustamante is a 49 year old male who presents with:   Encounter Diagnoses   Name Primary?     Dry eye syndrome, bilateral Yes     Myopia of both eyes with astigmatism and presbyopia        Plan:  Try substituting your artificial tears for a gel tear up to four times a day (like Refresh Liquigel or GenTeal Gel Tears)     Use warm compresses daily on the eyes     Glasses prescription given - recommend wearing glasses to sharpen up the distance (when driving)    Yossi Bolden MD  [phone]     Again, thank you for allowing me to participate in the care of your patient.        Sincerely,        Yossi Bolden MD    Electronically signed

## 2025-02-06 NOTE — PATIENT INSTRUCTIONS
Try substituting your artificial tears for a gel tear up to four times a day (like Refresh Liquigel or GenTeal Gel Tears)     Use warm compresses daily on the eyes     Glasses prescription given - recommend wearing glasses to sharpen up the distance (when driving)    Yossi Bolden MD  [phone]

## 2025-02-11 ENCOUNTER — OFFICE VISIT (OUTPATIENT)
Dept: FAMILY MEDICINE | Facility: CLINIC | Age: 50
End: 2025-02-11
Payer: COMMERCIAL

## 2025-02-11 VITALS
SYSTOLIC BLOOD PRESSURE: 128 MMHG | TEMPERATURE: 98.5 F | WEIGHT: 203.2 LBS | DIASTOLIC BLOOD PRESSURE: 81 MMHG | RESPIRATION RATE: 12 BRPM | BODY MASS INDEX: 28.45 KG/M2 | HEIGHT: 71 IN | OXYGEN SATURATION: 99 % | HEART RATE: 65 BPM

## 2025-02-11 DIAGNOSIS — Z71.84 ENCOUNTER FOR COUNSELING FOR TRAVEL: Primary | ICD-10-CM

## 2025-02-11 PROCEDURE — 99402 PREV MED CNSL INDIV APPRX 30: CPT | Mod: GA | Performed by: PHYSICIAN ASSISTANT

## 2025-02-11 RX ORDER — AZITHROMYCIN 500 MG/1
TABLET, FILM COATED ORAL
Qty: 6 TABLET | Refills: 0 | Status: SHIPPED | OUTPATIENT
Start: 2025-02-11

## 2025-02-11 RX ORDER — MEFLOQUINE HYDROCHLORIDE 250 MG/1
TABLET ORAL
Qty: 14 TABLET | Refills: 0 | Status: SHIPPED | OUTPATIENT
Start: 2025-02-11

## 2025-02-11 NOTE — PROGRESS NOTES
SUBJECTIVE: Christopher Butsamante , a 49 year old  male, presents for counseling and information regarding upcoming travel to [facility]. Special medical concerns include: none. He anticipates the following unusual exposures: wondered about vaccines if they were to have unprotected intercourse with other people.    Itinerary:  [facility]    Departure Date: 2/22/25 Return date: 4/20/25    Reason for travel (i.e. Business, pleasure): pleasure    Visiting an urban or rural area?: both    Accommodations (i.e. hotel, hostel, friends, family, etc): family and friends and hotel      IMMUNIZATION HISTORY  Have you received any vaccinations in the past 4 weeks? If so, which? Yes, Influenza  Have you ever fainted from having your blood drawn or from an injection?  No  Have you ever had any bad reaction or side effect from any vaccination?  If so, which? No  Do you live (or work closely) with anyone who has AIDS, an AIDS-like condition, any other immune disorder or who is on chemotherapy for cancer?  No  Have you received any injection of immune globulin or any blood products during the past 12 months?  No    GENERAL MEDICAL HISTORY  Do you have a medical condition that requires medicine or doctor follow-up visits?  No  Do you have a medical condition that is stable now, but that may recur while traveling?  No  Has your spleen been removed?  No  Have you had an illness or a fever in the past 48 hours?  No  Are you pregnant, or might you become pregnant on this trip?  Any chance of pregnancy?  No  Are you breastfeeding?  No  Do you have HIV, AIDS, an AIDS-like condition, any other immune disorder, leukemia or cancer?  No  Have you had your thymus gland removed or history of problems with your thymus, such as myasthenia gravis, DiGeorge syndrome, or thymoma?  No  Do you have a severely low platelet count (thrombocytopenia) or a blood clotting disorder?  No  Have you ever had a convulsion, seizure, epilepsy, neurologic condition or brain  infection?  No  Do you have any stomach conditions?  No  Do you have severe renal or kidney impairment?  No  Do you have a history of mental health concerns?  No  Do you get yeast infections often?  No  Do you have psoriasis?  No  Do you get motion sickness?  No  Have you ever had headaches, nausea, vomiting, or breathing problems from altitude exposure?  No    MEDICINES  Are you taking:   Steroids, prednisone, anti-cancer drugs, or medicines that suppress your immune system? No  Antibiotics or sulfonamides? No  Oral contraceptives (birth control pills)? No  Aspirin therapy (children and teens)? No    ALLERGIES  Are you allergic to:  Any medicines? No  Any foods or other? No  Neomycin, formalin, or fish products? No      No past medical history on file.   Immunization History   Administered Date(s) Administered    COVID-19 Bivalent 12+ (Pfizer) 11/08/2022    COVID-19 MONOVALENT 12+ (Pfizer) 04/24/2021, 05/22/2021, 01/08/2022    Influenza (IIV3) PF 10/11/2016    Influenza (prior to 2024) 10/10/2016    Influenza Vaccine >6 months,quad, PF 11/17/2014, 10/16/2015, 10/08/2017, 09/27/2018, 10/10/2019, 11/11/2021    Influenza Vaccine, 6+MO IM (QUADRIVALENT W/PRESERVATIVES) 10/10/2019, 09/18/2020    Influenza, Split Virus, Trivalent, Pf (Fluzone\Fluarix) 02/04/2025    Influenza,INJ,MDCK,PF,Quad >6mo(Flucelvax) 09/18/2020, 11/08/2022, 10/05/2023    MENINGOCOCCAL ACWY (MENQUADFI ) 01/30/2023    MMR 03/01/2014, 07/11/2017    Mantoux Tuberculin Skin Test 03/04/2015    Meningococcal ACWY (Menveo ) 10/27/2017    TDAP (Adacel,Boostrix) 03/01/2014, 01/30/2023    Twinrix A/B 11/24/2014, 12/18/2014, 06/11/2015    Typhoid IM 06/11/2015, 10/27/2017, 02/04/2021    Typhoid Oral 12/30/2020, 03/03/2023    Yellow Fever 10/27/2017       Current Outpatient Medications   Medication Sig Dispense Refill    polyethylene glycol-propylene glycol (SYSTANE ULTRA) 0.4-0.3 % SOLN ophthalmic solution Place 1 drop into both eyes 2 times daily.       vitamin D2 (ERGOCALCIFEROL) 89438 units (1250 mcg) capsule Take 1 capsule (50,000 Units) by mouth once a week 12 capsule 0     No Known Allergies     EXAM: deferred    Immunizations discussed include: Typhoid, Yellow Fever, cholera, and mpox (wanted a new typhoid script sent in although took it in 2023, will check insurance coverage for cholera and mpox, had yellow fever at [facility] in 2017 but lost card- he will call them for replacement)  Malaria prophylaxis recommended: mefloquine  Symptomatic treatment for traveler's diarrhea: bismuth subsalicylate, loperamide/diphenoxylate, and azithromycin    ASSESSMENT/PLAN:    (Z71.84) Encounter for counseling for travel  (primary encounter diagnosis)    Comment: No vaccines today. Patient will return or follow-up with PCP as needed. Prophylaxis given for Traveler's diarrhea and Malaria. All questions were answered. Patient was informed that vaccines may not be covered and should check with insurance company to be sure. They have decided to proceed with vaccines ordered today.    Plan: typhoid (VIVOTIF) CR capsule, mefloquine         (LARIAM) 250 MG tablet, azithromycin         (ZITHROMAX) 500 MG tablet              I have reviewed general recommendations for safe travel   including: food/water precautions, insect avoidance, safe sex   practices given high prevalence of HIV and other STDs,   roadway safety. Educational materials and links to the CDC   Traveler's health website have been provided.    Total time 24 minutes, greater than 50 percent in counseling   and coordination of care.

## 2025-02-11 NOTE — NURSING NOTE
Prior to immunization administration, verified patients identity using patient s name and date of birth. Please see Immunization Activity for additional information.     Screening Questionnaire for Adult Immunization    Are you sick today?   No   Do you have allergies to medications, food, a vaccine component or latex?   No   Have you ever had a serious reaction after receiving a vaccination?   No   Do you have a long-term health problem with heart, lung, kidney, or metabolic disease (e.g., diabetes), asthma, a blood disorder, no spleen, complement component deficiency, a cochlear implant, or a spinal fluid leak?  Are you on long-term aspirin therapy?   No   Do you have cancer, leukemia, HIV/AIDS, or any other immune system problem?   No   Do you have a parent, brother, or sister with an immune system problem?   No   In the past 3 months, have you taken medications that affect  your immune system, such as prednisone, other steroids, or anticancer drugs; drugs for the treatment of rheumatoid arthritis, Crohn s disease, or psoriasis; or have you had radiation treatments?   No   Have you had a seizure, or a brain or other nervous system problem?   No   During the past year, have you received a transfusion of blood or blood    products, or been given immune (gamma) globulin or antiviral drug?   No   For women: Are you pregnant or is there a chance you could become       pregnant during the next month?   No   Have you received any vaccinations in the past 4 weeks?   Yes     Immunization questionnaire was positive for at least one answer.  Notified Ethan Doshi PA-C.      Patient instructed to remain in clinic for 15 minutes afterwards, and to report any adverse reactions.     Screening performed by Jojo Lagunas MA on 2/11/2025 at 7:32 AM.

## 2025-02-14 ENCOUNTER — OFFICE VISIT (OUTPATIENT)
Dept: FAMILY MEDICINE | Facility: CLINIC | Age: 50
End: 2025-02-14
Payer: COMMERCIAL

## 2025-02-14 VITALS
SYSTOLIC BLOOD PRESSURE: 117 MMHG | WEIGHT: 202.1 LBS | HEART RATE: 75 BPM | HEIGHT: 72 IN | TEMPERATURE: 98.2 F | BODY MASS INDEX: 27.37 KG/M2 | DIASTOLIC BLOOD PRESSURE: 73 MMHG | OXYGEN SATURATION: 99 % | RESPIRATION RATE: 16 BRPM

## 2025-02-14 DIAGNOSIS — Z12.11 SPECIAL SCREENING FOR MALIGNANT NEOPLASMS, COLON: ICD-10-CM

## 2025-02-14 DIAGNOSIS — E55.9 VITAMIN D DEFICIENCY: ICD-10-CM

## 2025-02-14 DIAGNOSIS — Z12.5 SCREENING FOR PROSTATE CANCER: ICD-10-CM

## 2025-02-14 DIAGNOSIS — E78.2 MIXED HYPERLIPIDEMIA: ICD-10-CM

## 2025-02-14 DIAGNOSIS — Z00.00 ROUTINE GENERAL MEDICAL EXAMINATION AT A HEALTH CARE FACILITY: Primary | ICD-10-CM

## 2025-02-14 DIAGNOSIS — G89.29 CHRONIC LOW BACK PAIN WITHOUT SCIATICA, UNSPECIFIED BACK PAIN LATERALITY: ICD-10-CM

## 2025-02-14 DIAGNOSIS — M54.50 CHRONIC LOW BACK PAIN WITHOUT SCIATICA, UNSPECIFIED BACK PAIN LATERALITY: ICD-10-CM

## 2025-02-14 DIAGNOSIS — K76.0 HEPATIC STEATOSIS: ICD-10-CM

## 2025-02-14 DIAGNOSIS — K29.70 GASTRITIS WITHOUT BLEEDING, UNSPECIFIED CHRONICITY, UNSPECIFIED GASTRITIS TYPE: ICD-10-CM

## 2025-02-14 DIAGNOSIS — R73.03 PREDIABETES: ICD-10-CM

## 2025-02-14 LAB
ERYTHROCYTE [DISTWIDTH] IN BLOOD BY AUTOMATED COUNT: 12.6 % (ref 10–15)
EST. AVERAGE GLUCOSE BLD GHB EST-MCNC: 108 MG/DL
HBA1C MFR BLD: 5.4 % (ref 0–5.6)
HCT VFR BLD AUTO: 43.5 % (ref 40–53)
HGB BLD-MCNC: 14.2 G/DL (ref 13.3–17.7)
MCH RBC QN AUTO: 30.3 PG (ref 26.5–33)
MCHC RBC AUTO-ENTMCNC: 32.6 G/DL (ref 31.5–36.5)
MCV RBC AUTO: 93 FL (ref 78–100)
PLATELET # BLD AUTO: 183 10E3/UL (ref 150–450)
RBC # BLD AUTO: 4.69 10E6/UL (ref 4.4–5.9)
WBC # BLD AUTO: 5 10E3/UL (ref 4–11)

## 2025-02-14 PROCEDURE — 82306 VITAMIN D 25 HYDROXY: CPT | Performed by: FAMILY MEDICINE

## 2025-02-14 PROCEDURE — 36415 COLL VENOUS BLD VENIPUNCTURE: CPT | Performed by: FAMILY MEDICINE

## 2025-02-14 PROCEDURE — G2211 COMPLEX E/M VISIT ADD ON: HCPCS | Performed by: FAMILY MEDICINE

## 2025-02-14 PROCEDURE — 99396 PREV VISIT EST AGE 40-64: CPT | Performed by: FAMILY MEDICINE

## 2025-02-14 PROCEDURE — 85027 COMPLETE CBC AUTOMATED: CPT | Performed by: FAMILY MEDICINE

## 2025-02-14 PROCEDURE — 80053 COMPREHEN METABOLIC PANEL: CPT | Performed by: FAMILY MEDICINE

## 2025-02-14 PROCEDURE — 99214 OFFICE O/P EST MOD 30 MIN: CPT | Mod: 25 | Performed by: FAMILY MEDICINE

## 2025-02-14 PROCEDURE — 83036 HEMOGLOBIN GLYCOSYLATED A1C: CPT | Performed by: FAMILY MEDICINE

## 2025-02-14 PROCEDURE — G0103 PSA SCREENING: HCPCS | Performed by: FAMILY MEDICINE

## 2025-02-14 PROCEDURE — 80061 LIPID PANEL: CPT | Performed by: FAMILY MEDICINE

## 2025-02-14 RX ORDER — FAMOTIDINE 40 MG/1
40 TABLET, FILM COATED ORAL
COMMUNITY
Start: 2023-09-06

## 2025-02-14 RX ORDER — CYCLOSPORINE 0.5 MG/ML
EMULSION OPHTHALMIC
COMMUNITY
Start: 2023-09-06

## 2025-02-14 RX ORDER — PANTOPRAZOLE SODIUM 40 MG/1
40 TABLET, DELAYED RELEASE ORAL DAILY
Qty: 90 TABLET | Refills: 0 | Status: SHIPPED | OUTPATIENT
Start: 2025-02-14

## 2025-02-14 RX ORDER — FAMOTIDINE 40 MG/1
40 TABLET, FILM COATED ORAL
Status: CANCELLED | OUTPATIENT
Start: 2025-02-14

## 2025-02-14 RX ORDER — CYCLOBENZAPRINE HCL 10 MG
10 TABLET ORAL 3 TIMES DAILY PRN
Qty: 30 TABLET | Refills: 1 | Status: SHIPPED | OUTPATIENT
Start: 2025-02-14

## 2025-02-14 SDOH — HEALTH STABILITY: PHYSICAL HEALTH: ON AVERAGE, HOW MANY DAYS PER WEEK DO YOU ENGAGE IN MODERATE TO STRENUOUS EXERCISE (LIKE A BRISK WALK)?: 3 DAYS

## 2025-02-14 ASSESSMENT — SOCIAL DETERMINANTS OF HEALTH (SDOH): HOW OFTEN DO YOU GET TOGETHER WITH FRIENDS OR RELATIVES?: MORE THAN THREE TIMES A WEEK

## 2025-02-14 NOTE — LETTER
February 19, 2025      Christopher Bustamante  [address] [place]  [place] [address]        Dear ,    We are writing to inform you of your test results.    Low vitamin D level   Vitamin D supplementation was sent to preferred pharmacy   Please take as directed recheck level in 3 months for guidance     Satisfactory labs   Lab values marked (H) or (L) are not clinically/medically significant     Elevated LDL   Make an attempt to improve diet, cut back on the carbs and fats,  and exercise more efficiently     Resulted Orders   PSA, screen   Result Value Ref Range    Prostate Specific Antigen Screen 0.79 0.00 - 2.50 ng/mL    Narrative    This result is obtained using the Roche Elecsys total PSA method on the george e801 immunoassay analyzer, which is an ultrasensitive method. Results obtained with different assay methods or kits cannot be used interchangeably.  This test is intended for initial prostate cancer screening. PSA values exceeding the age-specific limits are suspicious for prostate disease, but additional testing, such as prostate biopsy, is needed to diagnose prostate pathology. The American Cancer Society recommends annual examination with digital rectal examination and serum PSA beginning at age 50 and for men with a life expectancy of at least 10 years after detection of prostate cancer. For men in high-risk groups, such as  Americans or men with a first-degree relative diagnosed at a younger age, testing should begin at a younger age. It is generally recommended that information be provided to patients about the benefits and limitations of testing and treatment so they can make informed decisions.   Comprehensive metabolic panel (BMP + Alb, Alk Phos, ALT, AST, Total. Bili, TP)   Result Value Ref Range    Sodium 139 135 - 145 mmol/L    Potassium 4.3 3.4 - 5.3 mmol/L    Carbon Dioxide (CO2) 24 22 - 29 mmol/L    Anion Gap 11 7 - 15 mmol/L    Urea Nitrogen 10.3 6.0 - 20.0 mg/dL    Creatinine  1.01 0.67 - 1.17 mg/dL    GFR Estimate >90 >60 mL/min/1.73m2      Comment:      eGFR calculated using 2021 CKD-EPI equation.    Calcium 9.8 8.8 - 10.4 mg/dL    Chloride 104 98 - 107 mmol/L    Glucose 103 (H) 70 - 99 mg/dL    Alkaline Phosphatase 76 40 - 150 U/L    AST 57 (H) 0 - 45 U/L    ALT 21 0 - 70 U/L    Protein Total 7.8 6.4 - 8.3 g/dL    Albumin 4.4 3.5 - 5.2 g/dL    Bilirubin Total 0.5 <=1.2 mg/dL    Patient Fasting > 8hrs? Yes    Lipid panel reflex to direct LDL Fasting   Result Value Ref Range    Cholesterol 173 <200 mg/dL    Triglycerides 55 <150 mg/dL    Direct Measure HDL 37 (L) >=40 mg/dL    LDL Cholesterol Calculated 125 (H) <100 mg/dL    Non HDL Cholesterol 136 (H) <130 mg/dL    Patient Fasting > 8hrs? Yes     Narrative    Cholesterol  Desirable: < 200 mg/dL  Borderline High: 200 - 239 mg/dL  High: >= 240 mg/dL    Triglycerides  Normal: < 150 mg/dL  Borderline High: 150 - 199 mg/dL  High: 200-499 mg/dL  Very High: >= 500 mg/dL    Direct Measure HDL  Female: >= 50 mg/dL   Male: >= 40 mg/dL    LDL Cholesterol  Desirable: < 100 mg/dL  Above Desirable: 100 - 129 mg/dL   Borderline High: 130 - 159 mg/dL   High:  160 - 189 mg/dL   Very High: >= 190 mg/dL    Non HDL Cholesterol  Desirable: < 130 mg/dL  Above Desirable: 130 - 159 mg/dL  Borderline High: 160 - 189 mg/dL  High: 190 - 219 mg/dL  Very High: >= 220 mg/dL   CBC with platelets   Result Value Ref Range    WBC Count 5.0 4.0 - 11.0 10e3/uL    RBC Count 4.69 4.40 - 5.90 10e6/uL    Hemoglobin 14.2 13.3 - 17.7 g/dL    Hematocrit 43.5 40.0 - 53.0 %    MCV 93 78 - 100 fL    MCH 30.3 26.5 - 33.0 pg    MCHC 32.6 31.5 - 36.5 g/dL    RDW 12.6 10.0 - 15.0 %    Platelet Count 183 150 - 450 10e3/uL   Hemoglobin A1c   Result Value Ref Range    Estimated Average Glucose 108 <117 mg/dL    Hemoglobin A1C 5.4 0.0 - 5.6 %      Comment:      Normal <5.7%   Prediabetes 5.7-6.4%    Diabetes 6.5% or higher     Note: Adopted from ADA consensus guidelines.   Vitamin D  Deficiency   Result Value Ref Range    Vitamin D, Total (25-Hydroxy) 17 (L) 20 - 50 ng/mL      Comment:      mild to moderate deficiency    Narrative    Season, race, dietary intake, and treatment affect the concentration of 25-hydroxy-Vitamin D. Values may decrease during winter months and increase during summer months.    Vitamin D determination is routinely performed by an immunoassay specific for 25 hydroxyvitamin D3.  If an individual is on vitamin D2(ergocalciferol) supplementation, please specify 25 OH vitamin D2 and D3 level determination by LCMSMS test [id number].         If you have any questions or concerns, please call the clinic at the number listed above.       Sincerely,      Teddy Garcia MD    Electronically signed

## 2025-02-14 NOTE — PATIENT INSTRUCTIONS
Patient Education   Preventive Care Advice   This is general advice given by our system to help you stay healthy. However, your care team may have specific advice just for you. Please talk to your care team about your preventive care needs.  Nutrition  Eat 5 or more servings of fruits and vegetables each day.  Try wheat bread, brown rice and whole grain pasta (instead of white bread, rice, and pasta).  Get enough calcium and vitamin D. Check the label on foods and aim for 100% of the RDA (recommended daily allowance).  Lifestyle  Exercise at least 150 minutes each week  (30 minutes a day, 5 days a week).  Do muscle strengthening activities 2 days a week. These help control your weight and prevent disease.  No smoking.  Wear sunscreen to prevent skin cancer.  Have a dental exam and cleaning every 6 months.  Yearly exams  See your health care team every year to talk about:  Any changes in your health.  Any medicines your care team has prescribed.  Preventive care, family planning, and ways to prevent chronic diseases.  Shots (vaccines)   HPV shots (up to age 26), if you've never had them before.  Hepatitis B shots (up to age 59), if you've never had them before.  COVID-19 shot: Get this shot when it's due.  Flu shot: Get a flu shot every year.  Tetanus shot: Get a tetanus shot every 10 years.  Pneumococcal, hepatitis A, and RSV shots: Ask your care team if you need these based on your risk.  Shingles shot (for age 50 and up)  General health tests  Diabetes screening:  Starting at age 35, Get screened for diabetes at least every 3 years.  If you are younger than age 35, ask your care team if you should be screened for diabetes.  Cholesterol test: At age 39, start having a cholesterol test every 5 years, or more often if advised.  Bone density scan (DEXA): At age 50, ask your care team if you should have this scan for osteoporosis (brittle bones).  Hepatitis C: Get tested at least once in your life.  STIs (sexually  transmitted infections)  Before age 24: Ask your care team if you should be screened for STIs.  After age 24: Get screened for STIs if you're at risk. You are at risk for STIs (including HIV) if:  You are sexually active with more than one person.  You don't use condoms every time.  You or a partner was diagnosed with a sexually transmitted infection.  If you are at risk for HIV, ask about PrEP medicine to prevent HIV.  Get tested for HIV at least once in your life, whether you are at risk for HIV or not.  Cancer screening tests  Cervical cancer screening: If you have a cervix, begin getting regular cervical cancer screening tests starting at age 21.  Breast cancer scan (mammogram): If you've ever had breasts, begin having regular mammograms starting at age 40. This is a scan to check for breast cancer.  Colon cancer screening: It is important to start screening for colon cancer at age 45.  Have a colonoscopy test every 10 years (or more often if you're at risk) Or, ask your provider about stool tests like a FIT test every year or Cologuard test every 3 years.  To learn more about your testing options, visit:   .  For help making a decision, visit:   https://bit.ly/cl42870.  Prostate cancer screening test: If you have a prostate, ask your care team if a prostate cancer screening test (PSA) at age 55 is right for you.  Lung cancer screening: If you are a current or former smoker ages 50 to 80, ask your care team if ongoing lung cancer screenings are right for you.  For informational purposes only. Not to replace the advice of your health care provider. Copyright   2023 [facility]. All rights reserved. Clinically reviewed by the [facility]. [employer] 117441 - REV 01/24.

## 2025-02-14 NOTE — PROGRESS NOTES
"Preventive Care Visit  [facility]  Teddy Garcia MD, Family Medicine  Feb 14, 2025      Assessment & Plan     (Z00.00) Routine general medical examination at a health care facility  (primary encounter diagnosis)  Comment:   Plan: Comprehensive metabolic panel (BMP + Alb, Alk         Phos, ALT, AST, Total. Bili, TP), Lipid panel         reflex to direct LDL Fasting, CBC with         platelets, Hemoglobin A1c, Vitamin D Deficiency            (K29.70) Gastritis without bleeding, unspecified chronicity, unspecified gastritis type  Comment:   Plan: pantoprazole (PROTONIX) 40 MG EC tablet            (E78.2) Mixed hyperlipidemia  Comment: elevated LDL  Plan: Make an attempt to improve diet, cut back on the carbs and fats,  and exercise more efficiently       (R73.03) Prediabetes  Comment: normal HgA1c       (E55.9) Vitamin D deficiency  Comment: low level   Plan: vitamin D2 (ERGOCALCIFEROL) 24113 units (1250         mcg) capsule, Vitamin D Deficiency        Recheck level in 3 months     (M54.50,  G89.29) Chronic low back pain without sciatica, unspecified back pain laterality  Comment: refill   Plan: cyclobenzaprine (FLEXERIL) 10 MG tablet            (K76.0) Hepatic steatosis  Comment: normalizing liver enzymes       (Z12.11) Special screening for malignant neoplasms, colon  Comment: 2023- negative Cologuard      (Z12.5) Screening for prostate cancer  Comment:   Plan: PSA, screen            Patient has been advised of split billing requirements and indicates understanding: Yes        BMI  Estimated body mass index is 27.79 kg/m  as calculated from the following:    Height as of this encounter: 1.816 m (5' 11.5\").    Weight as of this encounter: 91.7 kg (202 lb 1.6 oz).   Weight management plan: Discussed healthy diet and exercise guidelines    Counseling  Appropriate preventive services were addressed with this patient via screening, questionnaire, or discussion as appropriate for fall " prevention, nutrition, physical activity, Tobacco-use cessation, social engagement, weight loss and cognition.  Checklist reviewing preventive services available has been given to the patient.  Reviewed patient's diet, addressing concerns and/or questions.   He is at risk for lack of exercise and has been provided with information to increase physical activity for the benefit of his well-being.       The longitudinal plan of care for the diagnosis(es)/condition(s) as documented were addressed during this visit. Due to the added complexity in care, I will continue to support Christopher in the subsequent management and with ongoing continuity of care.    Subjective   Christopher is a 49 year old, presenting for the following:    Physical           2/14/2025     7:51 AM   Additional Questions   Roomed by HARLAN Dlil          HPI  he has been experiencing abdominal pain for over a month, lasting 2-3 months, described as a burning sensation, often felt while in bed. The pain worsens after eating large meals and improves when the stomach is empty. At one point, the pain became severe enough that the patient considered going to the ER, but by the time they were ready, it had improved. He was previously prescribed famotidine for GERD.            Health Care Directive  Patient does not have a Health Care Directive: Discussed advance care planning with patient; however, patient declined at this time.      2/14/2025   General Health   How would you rate your overall physical health? Excellent   Feel stress (tense, anxious, or unable to sleep) Not at all         2/14/2025   Nutrition   Three or more servings of calcium each day? Yes   Diet: Regular (no restrictions)    Low salt    Low fat/cholesterol   How many servings of fruit and vegetables per day? (!) 2-3   How many sweetened beverages each day? 0-1       Multiple values from one day are sorted in reverse-chronological order         2/14/2025   Exercise   Days per week of  moderate/strenous exercise 3 days         2/14/2025   Social Factors   Frequency of gathering with friends or relatives More than three times a week   Worry food won't last until get money to buy more No   Food not last or not have enough money for food? No   Do you have housing? (Housing is defined as stable permanent housing and does not include staying ouside in a car, in a tent, in an abandoned building, in an overnight shelter, or couch-surfing.) No   Are you worried about losing your housing? No   Lack of transportation? No   Unable to get utilities (heat,electricity)? No   Want help with housing or utility concern? No   (!) HOUSING CONCERN PRESENT      2/14/2025   Dental   Dentist two times every year? Yes         2/12/2024   TB Screening   Were you born outside of the [place]? (!) YES         Today's PHQ-9 Score:       2/14/2025     7:54 AM   PHQ-9 SCORE   PHQ-9 Total Score MyChart Incomplete         2/14/2025   Substance Use   Alcohol more than 3/day or more than 7/wk No   Do you use any other substances recreationally? No     Social History     Tobacco Use    Smoking status: Never     Passive exposure: Never    Smokeless tobacco: Never   Vaping Use    Vaping status: Never Used   Substance Use Topics    Alcohol use: Yes     Comment: socially           2/14/2025   STI Screening   New sexual partner(s) since last STI/HIV test? No   ASCVD Risk   The 10-year ASCVD risk score (Jennifer MORA, et al., 2019) is: 4.4%    Values used to calculate the score:      Age: 49 years      Sex: Male      Is Non- : Yes      Diabetic: No      Tobacco smoker: No      Systolic Blood Pressure: 117 mmHg      Is BP treated: No      HDL Cholesterol: 36 mg/dL      Total Cholesterol: 186 mg/dL        2/14/2025   Contraception/Family Planning   Questions about contraception or family planning No        Reviewed and updated as needed this visit by Provider                             Objective    Exam  /73   " Pulse 75   Temp 98.2  F (36.8  C) (Oral)   Resp 16   Ht 1.816 m (5' 11.5\")   Wt 91.7 kg (202 lb 1.6 oz)   SpO2 99%   BMI 27.79 kg/m     Estimated body mass index is 27.79 kg/m  as calculated from the following:    Height as of this encounter: 1.816 m (5' 11.5\").    Weight as of this encounter: 91.7 kg (202 lb 1.6 oz).    Physical Exam  GENERAL: alert and no distress  EYES: Eyes grossly normal to inspection, PERRL and conjunctivae and sclerae normal  HENT: oropharynx clear and oral mucous membranes moist  NECK: no adenopathy, no asymmetry, masses, or scars  RESP: lungs clear to auscultation - no rales, rhonchi or wheezes  CV: regular rate and rhythm, normal S1 S2, no S3 or S4, no murmur, click or rub, no peripheral edema  ABDOMEN: soft, nontender, no hepatosplenomegaly, no masses and bowel sounds normal  RECTAL: normal sphincter tone, no rectal masses, prostate normal size, smooth, nontender without nodules or masses  MS: no gross musculoskeletal defects noted, no edema  SKIN: no suspicious lesions or rashes  NEURO: Normal strength and tone, mentation intact and speech normal  PSYCH: mentation appears normal, affect normal/bright        Signed Electronically by: Teddy Garcia MD    Answers submitted by the patient for this visit:  Patient Health Questionnaire (Submitted on 2/14/2025)  PHQ9 TOTAL SCORE: Incomplete    "

## 2025-02-15 LAB
ALBUMIN SERPL BCG-MCNC: 4.4 G/DL (ref 3.5–5.2)
ALP SERPL-CCNC: 76 U/L (ref 40–150)
ALT SERPL W P-5'-P-CCNC: 21 U/L (ref 0–70)
ANION GAP SERPL CALCULATED.3IONS-SCNC: 11 MMOL/L (ref 7–15)
AST SERPL W P-5'-P-CCNC: 57 U/L (ref 0–45)
BILIRUB SERPL-MCNC: 0.5 MG/DL
BUN SERPL-MCNC: 10.3 MG/DL (ref 6–20)
CALCIUM SERPL-MCNC: 9.8 MG/DL (ref 8.8–10.4)
CHLORIDE SERPL-SCNC: 104 MMOL/L (ref 98–107)
CHOLEST SERPL-MCNC: 173 MG/DL
CREAT SERPL-MCNC: 1.01 MG/DL (ref 0.67–1.17)
EGFRCR SERPLBLD CKD-EPI 2021: >90 ML/MIN/1.73M2
FASTING STATUS PATIENT QL REPORTED: YES
FASTING STATUS PATIENT QL REPORTED: YES
GLUCOSE SERPL-MCNC: 103 MG/DL (ref 70–99)
HCO3 SERPL-SCNC: 24 MMOL/L (ref 22–29)
HDLC SERPL-MCNC: 37 MG/DL
LDLC SERPL CALC-MCNC: 125 MG/DL
NONHDLC SERPL-MCNC: 136 MG/DL
POTASSIUM SERPL-SCNC: 4.3 MMOL/L (ref 3.4–5.3)
PROT SERPL-MCNC: 7.8 G/DL (ref 6.4–8.3)
PSA SERPL DL<=0.01 NG/ML-MCNC: 0.79 NG/ML (ref 0–2.5)
SODIUM SERPL-SCNC: 139 MMOL/L (ref 135–145)
TRIGL SERPL-MCNC: 55 MG/DL
VIT D+METAB SERPL-MCNC: 17 NG/ML (ref 20–50)

## 2025-02-18 RX ORDER — ERGOCALCIFEROL 1.25 MG/1
50000 CAPSULE, LIQUID FILLED ORAL WEEKLY
Qty: 12 CAPSULE | Refills: 0 | Status: SHIPPED | OUTPATIENT
Start: 2025-02-18

## 2025-02-19 ENCOUNTER — TELEPHONE (OUTPATIENT)
Dept: FAMILY MEDICINE | Facility: CLINIC | Age: 50
End: 2025-02-19
Payer: COMMERCIAL

## 2025-05-12 DIAGNOSIS — E55.9 VITAMIN D DEFICIENCY: ICD-10-CM

## 2025-05-12 DIAGNOSIS — K29.70 GASTRITIS WITHOUT BLEEDING, UNSPECIFIED CHRONICITY, UNSPECIFIED GASTRITIS TYPE: ICD-10-CM

## 2025-05-12 NOTE — LETTER
May 20, 2025      Christopher Bustamante  [address] [place]  [place] [address]        Dear Christopher,     We have attempted to reach you via phone, but have not received a response back. Please call our office at [phone].    Sincerely,        Teddy Garcia MD    Electronically signed

## 2025-05-14 RX ORDER — ERGOCALCIFEROL 1.25 MG/1
50000 CAPSULE, LIQUID FILLED ORAL WEEKLY
Qty: 12 CAPSULE | Refills: 0 | OUTPATIENT
Start: 2025-05-14

## 2025-05-14 RX ORDER — PANTOPRAZOLE SODIUM 40 MG/1
40 TABLET, DELAYED RELEASE ORAL DAILY
Qty: 90 TABLET | Refills: 0 | Status: SHIPPED | OUTPATIENT
Start: 2025-05-14

## 2025-05-14 NOTE — TELEPHONE ENCOUNTER
Patient to follow up with PCP to discuss ongoing and future plan of care of vitamin D deficiency.     Pantoprazole refilled

## 2025-05-16 DIAGNOSIS — E55.9 VITAMIN D DEFICIENCY: ICD-10-CM

## 2025-05-19 RX ORDER — ERGOCALCIFEROL 1.25 MG/1
50000 CAPSULE, LIQUID FILLED ORAL WEEKLY
Qty: 12 CAPSULE | Refills: 0 | OUTPATIENT
Start: 2025-05-19

## 2025-05-20 ENCOUNTER — TELEPHONE (OUTPATIENT)
Dept: FAMILY MEDICINE | Facility: CLINIC | Age: 50
End: 2025-05-20
Payer: COMMERCIAL

## 2025-05-20 DIAGNOSIS — E55.9 VITAMIN D DEFICIENCY: ICD-10-CM

## 2025-05-20 RX ORDER — ERGOCALCIFEROL 1.25 MG/1
50000 CAPSULE, LIQUID FILLED ORAL WEEKLY
Qty: 12 CAPSULE | Refills: 0 | Status: CANCELLED | OUTPATIENT
Start: 2025-05-20

## 2025-05-20 NOTE — TELEPHONE ENCOUNTER
FYI - Status Update    Who is Calling: patient    Update: Patient states his insurance is no longer active, he is waiting for insurance to get back active to schedule appointments.    Does caller want a call/response back: No

## 2025-05-20 NOTE — TELEPHONE ENCOUNTER
Left message for patient to call back. Patient was last seen for physical, but was advised to do a 3mo follow up please assist in scheduling with Dr. MEJIA or Partha    Multiple attempts- letter sent via mail.